# Patient Record
Sex: FEMALE | Race: WHITE | NOT HISPANIC OR LATINO | Employment: OTHER | URBAN - METROPOLITAN AREA
[De-identification: names, ages, dates, MRNs, and addresses within clinical notes are randomized per-mention and may not be internally consistent; named-entity substitution may affect disease eponyms.]

---

## 2020-01-09 ENCOUNTER — OFFICE VISIT (OUTPATIENT)
Dept: PODIATRY | Facility: CLINIC | Age: 70
End: 2020-01-09
Payer: MEDICARE

## 2020-01-09 ENCOUNTER — HOSPITAL ENCOUNTER (OUTPATIENT)
Dept: RADIOLOGY | Facility: HOSPITAL | Age: 70
Discharge: HOME | End: 2020-01-09
Attending: PODIATRIST
Payer: MEDICARE

## 2020-01-09 VITALS
RESPIRATION RATE: 19 BRPM | OXYGEN SATURATION: 98 % | SYSTOLIC BLOOD PRESSURE: 153 MMHG | TEMPERATURE: 98 F | DIASTOLIC BLOOD PRESSURE: 90 MMHG | HEIGHT: 61 IN | BODY MASS INDEX: 32.1 KG/M2 | WEIGHT: 170 LBS | HEART RATE: 74 BPM

## 2020-01-09 DIAGNOSIS — M79.671 HEEL PAIN, BILATERAL: ICD-10-CM

## 2020-01-09 DIAGNOSIS — M76.62 ACHILLES TENDINITIS OF LEFT LOWER EXTREMITY: ICD-10-CM

## 2020-01-09 DIAGNOSIS — M79.672 HEEL PAIN, BILATERAL: ICD-10-CM

## 2020-01-09 DIAGNOSIS — M77.51 BURSITIS OF POSTERIOR HEEL, RIGHT: Primary | ICD-10-CM

## 2020-01-09 DIAGNOSIS — M77.31 CALCANEAL SPUR OF RIGHT FOOT: ICD-10-CM

## 2020-01-09 DIAGNOSIS — G57.91 NEURITIS OF RIGHT FOOT: ICD-10-CM

## 2020-01-09 DIAGNOSIS — M77.52 BURSITIS OF POSTERIOR HEEL, LEFT: ICD-10-CM

## 2020-01-09 DIAGNOSIS — M76.61 ACHILLES TENDINITIS OF RIGHT LOWER EXTREMITY: ICD-10-CM

## 2020-01-09 PROCEDURE — 99999 PR PBB SHADOW E&M-NEW PATIENT-LVL III: ICD-10-PCS | Mod: PBBFAC,,, | Performed by: PODIATRIST

## 2020-01-09 PROCEDURE — 73630 X-RAY EXAM OF FOOT: CPT | Mod: 50,TC,FY

## 2020-01-09 PROCEDURE — 1125F AMNT PAIN NOTED PAIN PRSNT: CPT | Mod: ,,, | Performed by: PODIATRIST

## 2020-01-09 PROCEDURE — 73630 XR FOOT COMPLETE 3 VIEW BILATERAL: ICD-10-PCS | Mod: 26,50,, | Performed by: RADIOLOGY

## 2020-01-09 PROCEDURE — 99204 OFFICE O/P NEW MOD 45 MIN: CPT | Mod: 25,S$PBB,, | Performed by: PODIATRIST

## 2020-01-09 PROCEDURE — 1159F MED LIST DOCD IN RCRD: CPT | Mod: ,,, | Performed by: PODIATRIST

## 2020-01-09 PROCEDURE — 99204 PR OFFICE/OUTPT VISIT, NEW, LEVL IV, 45-59 MIN: ICD-10-PCS | Mod: 25,S$PBB,, | Performed by: PODIATRIST

## 2020-01-09 PROCEDURE — 1125F PR PAIN SEVERITY QUANTIFIED, PAIN PRESENT: ICD-10-PCS | Mod: ,,, | Performed by: PODIATRIST

## 2020-01-09 PROCEDURE — 1159F PR MEDICATION LIST DOCUMENTED IN MEDICAL RECORD: ICD-10-PCS | Mod: ,,, | Performed by: PODIATRIST

## 2020-01-09 PROCEDURE — 96372 THER/PROPH/DIAG INJ SC/IM: CPT | Mod: PBBFAC,RT

## 2020-01-09 PROCEDURE — 73630 X-RAY EXAM OF FOOT: CPT | Mod: 26,50,, | Performed by: RADIOLOGY

## 2020-01-09 PROCEDURE — 99999 PR PBB SHADOW E&M-NEW PATIENT-LVL III: CPT | Mod: PBBFAC,,, | Performed by: PODIATRIST

## 2020-01-09 PROCEDURE — 99203 OFFICE O/P NEW LOW 30 MIN: CPT | Mod: PBBFAC,25 | Performed by: PODIATRIST

## 2020-01-09 RX ORDER — LOSARTAN POTASSIUM 50 MG/1
TABLET ORAL
COMMUNITY
Start: 2019-12-29 | End: 2020-09-03

## 2020-01-09 RX ORDER — BETAMETHASONE SODIUM PHOSPHATE AND BETAMETHASONE ACETATE 3; 3 MG/ML; MG/ML
18 INJECTION, SUSPENSION INTRA-ARTICULAR; INTRALESIONAL; INTRAMUSCULAR; SOFT TISSUE
Status: COMPLETED | OUTPATIENT
Start: 2020-01-09 | End: 2020-01-09

## 2020-01-09 RX ORDER — METOPROLOL SUCCINATE 50 MG/1
100 TABLET, EXTENDED RELEASE ORAL
COMMUNITY
Start: 2019-12-26 | End: 2020-09-03

## 2020-01-09 RX ORDER — CETIRIZINE HYDROCHLORIDE 10 MG/1
10 TABLET ORAL DAILY
COMMUNITY
Start: 2019-12-26

## 2020-01-09 RX ORDER — LOSARTAN POTASSIUM 25 MG/1
TABLET ORAL
COMMUNITY
Start: 2019-12-14 | End: 2020-09-03

## 2020-01-09 RX ORDER — PANTOPRAZOLE SODIUM 40 MG/1
TABLET, DELAYED RELEASE ORAL
COMMUNITY
Start: 2019-12-16

## 2020-01-09 RX ORDER — HYDROCHLOROTHIAZIDE 12.5 MG/1
TABLET ORAL
COMMUNITY
Start: 2019-12-29 | End: 2020-09-03

## 2020-01-09 RX ORDER — DICLOFENAC SODIUM 10 MG/G
2 GEL TOPICAL 3 TIMES DAILY
Qty: 100 G | Refills: 2 | Status: SHIPPED | OUTPATIENT
Start: 2020-01-09 | End: 2020-09-03

## 2020-01-09 RX ORDER — CYCLOBENZAPRINE HCL 10 MG
TABLET ORAL
COMMUNITY
Start: 2020-01-08

## 2020-01-09 RX ORDER — LEVOTHYROXINE SODIUM 75 UG/1
TABLET ORAL
COMMUNITY
Start: 2019-12-16

## 2020-01-09 RX ADMIN — BETAMETHASONE ACETATE AND BETAMETHASONE SODIUM PHOSPHATE 18 MG: 3; 3 INJECTION, SUSPENSION INTRA-ARTICULAR; INTRALESIONAL; INTRAMUSCULAR; SOFT TISSUE at 10:01

## 2020-01-11 NOTE — PROGRESS NOTES
"Subjective:       Patient ID: Judith Reyer is a 69 y.o. female.    Chief Complaint: Foot Problem (heel feels like she is walking on bone)  Patient presents with complaint of pain on the back of both heels, "feels like she is walking on bones."  States bone is sticking out of the back of the heels, very swollen, right much worse than the left.  Reports pain started gradually over the last few months, having great difficulty walking especially with the right foot.  Denies injury. Can only wear one pair shoes and not very comfortably. Pain level 10/10    Past Medical History:   Diagnosis Date    Allergy     Hypertension     Hypothyroidism      History reviewed. No pertinent surgical history.  History reviewed. No pertinent family history.  Social History     Socioeconomic History    Marital status:      Spouse name: Not on file    Number of children: Not on file    Years of education: Not on file    Highest education level: Not on file   Occupational History    Not on file   Social Needs    Financial resource strain: Not on file    Food insecurity:     Worry: Not on file     Inability: Not on file    Transportation needs:     Medical: Not on file     Non-medical: Not on file   Tobacco Use    Smoking status: Former Smoker    Smokeless tobacco: Never Used    Tobacco comment: 3 years ago quit   Substance and Sexual Activity    Alcohol use: Not Currently    Drug use: Never    Sexual activity: Not Currently   Lifestyle    Physical activity:     Days per week: Not on file     Minutes per session: Not on file    Stress: Not on file   Relationships    Social connections:     Talks on phone: Not on file     Gets together: Not on file     Attends Restorationism service: Not on file     Active member of club or organization: Not on file     Attends meetings of clubs or organizations: Not on file     Relationship status: Not on file   Other Topics Concern    Not on file   Social History Narrative    Not on " "file       Current Outpatient Medications   Medication Sig Dispense Refill    cetirizine (ZYRTEC) 10 MG tablet Take 10 mg by mouth once daily.      cyclobenzaprine (FLEXERIL) 10 MG tablet       diclofenac sodium (VOLTAREN) 1 % Gel Apply 2 g topically 3 (three) times daily. 100 g 2    hydroCHLOROthiazide (HYDRODIURIL) 12.5 MG Tab       losartan (COZAAR) 25 MG tablet       losartan (COZAAR) 50 MG tablet       metoprolol succinate (TOPROL-XL) 50 MG 24 hr tablet       pantoprazole (PROTONIX) 40 MG tablet       SYNTHROID 75 mcg tablet        No current facility-administered medications for this visit.      Review of patient's allergies indicates:   Allergen Reactions    Macrodantin [nitrofurantoin macrocrystalline]     Penicillins        Review of Systems   Constitutional: Negative for fever.   HENT: Negative for congestion.    Respiratory: Negative for cough and shortness of breath.    Cardiovascular: Negative for leg swelling.        Positive foot swelling   Musculoskeletal: Positive for gait problem.        Antalgic gait due to pain    All other systems reviewed and are negative.      Objective:      Vitals:    01/09/20 0855   BP: (!) 153/90   Pulse: 74   Resp: 19   Temp: 97.6 °F (36.4 °C)   TempSrc: Oral   SpO2: 98%   Weight: 77.1 kg (170 lb)   Height: 5' 1" (1.549 m)     Physical Exam   Constitutional: She appears well-developed and well-nourished.   Pulmonary/Chest: Effort normal.   Musculoskeletal: She exhibits edema, tenderness and deformity.   Skin: Skin is warm. Capillary refill takes less than 2 seconds. There is erythema.   Posterior right heel   Psychiatric: She has a normal mood and affect.   Nursing note and vitals reviewed.  Vascular         Normal CFT bilateral   No lower extremity edema bilateral   Pedal skin temperature and color are normal bilateral     Integumentary   Protruding edema, calor due to bursitis posterior right heel.  Mild bursitis with edema, no calor left heel     "     Neurological   Gross sensation intact bilateral feet     Musculoskeletal   Muscle Strength/Testing and Tone:  Guarding R>L,  normal tone bilateral   Joints, Bones, and Muscles:  Painful bursitis and Achilles tendinitis right greater than left, limited range of motion right greater than left        Antalgic gait         Presents in appropriate shoes      X-Ray Foot Complete Bilateral   Details     Reading Physician Reading Date Result Priority   Isidoro Quezada MD 1/9/2020 Routine      Narrative     EXAMINATION:  XR FOOT COMPLETE 3 VIEW BILATERAL    CLINICAL HISTORY:  Pain in right foot    TECHNIQUE:  AP, lateral, and oblique views of both feet were performed.    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  The remaining joint spaces are preserved.    Tarsal bones are intact.  Normal tarsometatarsal alignment.  Bipartite os peroneum.  Prominent dorsal and plantar calcaneal spurs.    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 The remaining joint spaces are preserved.    Tarsal bones are intact. Normal tarsometatarsal alignment. Bipartite os peroneum.  Small dorsal and prominent plantar calcaneal spurs.      Impression       1. No acute radiographic findings of the bilateral feet.  2. Bilateral calcaneal spurs.                Assessment:       1. Bursitis of posterior heel, right    2. Achilles tendinitis of right lower extremity    3. Calcaneal spur of right foot    4. Neuritis of right foot    5. Heel pain, bilateral    6. Bursitis of posterior heel, left    7. Achilles tendinitis of left lower extremity        Plan:           X-RAY THREE VIEWS BILATERAL FEET  3 CC BETAMETHASONE RIGHT SIDE  VOLTAREN GEL 1% 3 TIMES DAILY BILATERAL  AIRCAST WALKING BOOT RIGHT      Reviewed large posterior calcaneal spur on the right in the area of pain and significant amount of  inflammation /bursitis.  Advised patient however this has been present for a lengthy time and treatments for inflammation can be very effective.   Pointed out patient is starting to have the same symptoms on the back of the left heel with absolutely no spur on x-ray.  Discussed treatments for inflammation.  We reviewed oral and topical anti-inflammatory, ice /cool therapy and immobilization for the right foot  Recommended IM cortisone injection due to pain level, location, amount of bursitis noted and symptoms in the other foot.   We discussed effectiveness of this medication in reducing inflammation, potential side effects and length of time injection may be beneficial.  Explained this is done in combination with all other conservative treatments for best results.  Patient  Was in understanding and agreement with treatment plan and did want to pursue IM cortisone injection today.  Administered 3 cc betamethasone right side.  Dispensed orders for walking boot, put arch support inside, wear at all times of weight-bearing  Reviewed frequency and with Voltaren gel and ice therapy are to be applied bilateral  Limit activity  Patient/family were in understanding and agreement with treatment plan.  I counseled the patient on their conditions, implications and medical management.  Instructed patient/family to contact the office with any changes, questions, concerns, worsening of symptoms.   Total face-to-face time, exam, assessment, treatment, discussion, documentation >45 minutes, more than half this time spent on consultation and coordination of care.  Follow up 1 week    This note was created using [employer] voice recognition software that occasionally misinterpreted phrases or words.

## 2020-01-16 ENCOUNTER — OFFICE VISIT (OUTPATIENT)
Dept: PODIATRY | Facility: CLINIC | Age: 70
End: 2020-01-16
Payer: MEDICARE

## 2020-01-16 VITALS
BODY MASS INDEX: 32.1 KG/M2 | TEMPERATURE: 98 F | WEIGHT: 170 LBS | HEART RATE: 51 BPM | HEIGHT: 61 IN | DIASTOLIC BLOOD PRESSURE: 81 MMHG | SYSTOLIC BLOOD PRESSURE: 133 MMHG | OXYGEN SATURATION: 96 %

## 2020-01-16 DIAGNOSIS — M77.31 CALCANEAL SPUR OF RIGHT FOOT: ICD-10-CM

## 2020-01-16 DIAGNOSIS — G57.91 NEURITIS OF RIGHT FOOT: ICD-10-CM

## 2020-01-16 DIAGNOSIS — M79.671 HEEL PAIN, BILATERAL: ICD-10-CM

## 2020-01-16 DIAGNOSIS — M77.51 BURSITIS OF POSTERIOR HEEL, RIGHT: ICD-10-CM

## 2020-01-16 DIAGNOSIS — M79.672 HEEL PAIN, BILATERAL: ICD-10-CM

## 2020-01-16 DIAGNOSIS — M76.61 ACHILLES TENDINITIS OF RIGHT LOWER EXTREMITY: Primary | ICD-10-CM

## 2020-01-16 PROCEDURE — 96372 THER/PROPH/DIAG INJ SC/IM: CPT | Mod: PBBFAC,RT

## 2020-01-16 PROCEDURE — 1125F PR PAIN SEVERITY QUANTIFIED, PAIN PRESENT: ICD-10-PCS | Mod: ,,, | Performed by: PODIATRIST

## 2020-01-16 PROCEDURE — 1159F MED LIST DOCD IN RCRD: CPT | Mod: ,,, | Performed by: PODIATRIST

## 2020-01-16 PROCEDURE — 1125F AMNT PAIN NOTED PAIN PRSNT: CPT | Mod: ,,, | Performed by: PODIATRIST

## 2020-01-16 PROCEDURE — 99213 OFFICE O/P EST LOW 20 MIN: CPT | Mod: PBBFAC,25 | Performed by: PODIATRIST

## 2020-01-16 PROCEDURE — 99214 OFFICE O/P EST MOD 30 MIN: CPT | Mod: 25,S$PBB,, | Performed by: PODIATRIST

## 2020-01-16 PROCEDURE — 99999 PR PBB SHADOW E&M-EST. PATIENT-LVL III: ICD-10-PCS | Mod: PBBFAC,,, | Performed by: PODIATRIST

## 2020-01-16 PROCEDURE — 99999 PR PBB SHADOW E&M-EST. PATIENT-LVL III: CPT | Mod: PBBFAC,,, | Performed by: PODIATRIST

## 2020-01-16 PROCEDURE — 1159F PR MEDICATION LIST DOCUMENTED IN MEDICAL RECORD: ICD-10-PCS | Mod: ,,, | Performed by: PODIATRIST

## 2020-01-16 PROCEDURE — 99214 PR OFFICE/OUTPT VISIT, EST, LEVL IV, 30-39 MIN: ICD-10-PCS | Mod: 25,S$PBB,, | Performed by: PODIATRIST

## 2020-01-16 RX ORDER — NAPROXEN SODIUM 220 MG
220 TABLET ORAL 2 TIMES DAILY WITH MEALS
COMMUNITY
End: 2020-09-03

## 2020-01-16 RX ORDER — UBIDECARENONE 75 MG
2500 CAPSULE ORAL DAILY
COMMUNITY
End: 2020-09-03

## 2020-01-16 RX ORDER — KETOROLAC TROMETHAMINE 30 MG/ML
30 INJECTION, SOLUTION INTRAMUSCULAR; INTRAVENOUS
Status: COMPLETED | OUTPATIENT
Start: 2020-01-16 | End: 2020-01-16

## 2020-01-16 RX ORDER — AMOXICILLIN 500 MG
550 CAPSULE ORAL DAILY
COMMUNITY
End: 2022-11-26 | Stop reason: SDUPTHER

## 2020-01-16 RX ADMIN — KETOROLAC TROMETHAMINE 30 MG: 30 INJECTION, SOLUTION INTRAMUSCULAR; INTRAVENOUS at 11:01

## 2020-01-18 PROBLEM — M76.61 ACHILLES TENDINITIS OF RIGHT LOWER EXTREMITY: Status: ACTIVE | Noted: 2020-01-18

## 2020-01-18 PROBLEM — M77.31 CALCANEAL SPUR OF RIGHT FOOT: Status: ACTIVE | Noted: 2020-01-18

## 2020-01-18 PROBLEM — M77.51: Status: ACTIVE | Noted: 2020-01-18

## 2020-01-18 NOTE — PROGRESS NOTES
Subjective:       Patient ID: Judith Reyer is a 69 y.o. female.    Chief Complaint: Follow-up (bursitis of posterior heel right)  Patient presents for follow-up Achilles tendinitis and bursitis with large calcaneal spur right.  Mild bursitis and Achilles tendinitis left.  Patient confirms she has been wearing walking boot at all times she feels the boot and cortisone injection has helped significantly and is noticed a lot of swelling on the back of the heel has resolved.  She is applying Voltaren gel twice a day, pain level down from 10 to 6/10.        Past Medical History:   Diagnosis Date    Allergy     Hypertension     Hypothyroidism      Past Surgical History:   Procedure Laterality Date    CHOLECYSTECTOMY      FOOT SURGERY      HYSTERECTOMY       Family History   Problem Relation Age of Onset    Stroke Mother     Cirrhosis Father      Social History     Socioeconomic History    Marital status:      Spouse name: Not on file    Number of children: Not on file    Years of education: Not on file    Highest education level: Not on file   Occupational History    Not on file   Social Needs    Financial resource strain: Not on file    Food insecurity:     Worry: Not on file     Inability: Not on file    Transportation needs:     Medical: Not on file     Non-medical: Not on file   Tobacco Use    Smoking status: Former Smoker    Smokeless tobacco: Never Used    Tobacco comment: 3 years ago quit   Substance and Sexual Activity    Alcohol use: Not Currently    Drug use: Never    Sexual activity: Not Currently   Lifestyle    Physical activity:     Days per week: Not on file     Minutes per session: Not on file    Stress: Not on file   Relationships    Social connections:     Talks on phone: Not on file     Gets together: Not on file     Attends Denominational service: Not on file     Active member of club or organization: Not on file     Attends meetings of clubs or organizations: Not on file      "Relationship status: Not on file   Other Topics Concern    Not on file   Social History Narrative    Not on file       Current Outpatient Medications   Medication Sig Dispense Refill    cetirizine (ZYRTEC) 10 MG tablet Take 10 mg by mouth once daily.      cyanocobalamin 500 MCG tablet Take 2,500 mcg by mouth once daily.      cyclobenzaprine (FLEXERIL) 10 MG tablet       diclofenac sodium (VOLTAREN) 1 % Gel Apply 2 g topically 3 (three) times daily. 100 g 2    hydroCHLOROthiazide (HYDRODIURIL) 12.5 MG Tab       losartan (COZAAR) 25 MG tablet       losartan (COZAAR) 50 MG tablet       metoprolol succinate (TOPROL-XL) 50 MG 24 hr tablet       naproxen sodium (ANAPROX) 220 MG tablet Take 220 mg by mouth 2 (two) times daily with meals.      omega-3 fatty acids/fish oil (FISH OIL-OMEGA-3 FATTY ACIDS) 300-1,000 mg capsule Take 550 capsules by mouth once daily.      pantoprazole (PROTONIX) 40 MG tablet       SYNTHROID 75 mcg tablet        No current facility-administered medications for this visit.      Review of patient's allergies indicates:   Allergen Reactions    Macrodantin [nitrofurantoin macrocrystalline]     Penicillins        Review of Systems   Constitutional: Negative for fever.   HENT: Negative for congestion.    Respiratory: Negative for cough.    Cardiovascular: Negative for leg swelling.        Positive foot swelling   Musculoskeletal: Positive for gait problem.        Aircast boot right   All other systems reviewed and are negative.      Objective:      Vitals:    01/16/20 1018   BP: 133/81   Pulse: (!) 51   Temp: 98 °F (36.7 °C)   TempSrc: Oral   SpO2: 96%   Weight: 77.1 kg (170 lb)   Height: 5' 1" (1.549 m)     Physical Exam   Constitutional: She appears well-developed and well-nourished.   Cardiovascular:   Pulses:       Dorsalis pedis pulses are 2+ on the right side, and 2+ on the left side.        Posterior tibial pulses are 2+ on the right side, and 2+ on the left side. "   Pulmonary/Chest: Effort normal.   Musculoskeletal: She exhibits edema, tenderness and deformity.        Right foot: There is decreased range of motion and deformity.        Left foot: There is decreased range of motion.   Feet:   Right Foot:   Skin Integrity: Negative for erythema or warmth.   Left Foot:   Skin Integrity: Negative for erythema or warmth.   Skin: Capillary refill takes less than 2 seconds.   Posterior right heel   Psychiatric: She has a normal mood and affect.   Nursing note and vitals reviewed.  Vascular         Normal CFT bilateral   No lower extremity edema bilateral   Pedal skin temperature and color are normal bilateral     Integumentary   Considerable decrease in protruding edema/ bursitis posterior right heel, o erythema or calor  Minimal edema/bursitis posterior left heel, no erythema or calor         Neurological   Gross sensation intact bilateral feet     Musculoskeletal   Muscle Strength/Testing and Tone:   Improvement in strength with less guarding right foot and ankle, intact with little discomfort left ankle,  normal tone bilateral   Joints, Bones, and Muscles:  Bursitis and Achilles tendinitis right greater than left  AJ equinus right greater than left              X-Ray Foot Complete Bilateral   Details     Reading Physician Reading Date Result Priority   Isidoro Quezada MD 1/9/2020 Routine      Narrative     EXAMINATION:  XR FOOT COMPLETE 3 VIEW BILATERAL    CLINICAL HISTORY:  Pain in right foot    TECHNIQUE:  AP, lateral, and oblique views of both feet were performed.    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  The remaining joint spaces are preserved.    Tarsal bones are intact.  Normal tarsometatarsal alignment.  Bipartite os peroneum.  Prominent dorsal and plantar calcaneal spurs.    Left:    Mild degenerative osteoarthrosis involving the 1st MTP joint and the IP joints of the  digits. The remaining joint spaces are preserved.    Tarsal bones are intact. Normal tarsometatarsal alignment. Bipartite os peroneum.  Small dorsal and prominent plantar calcaneal spurs.      Impression       1. No acute radiographic findings of the bilateral feet.  2. Bilateral calcaneal spurs.                Assessment:       1. Achilles tendinitis of right lower extremity    2. Bursitis of posterior heel, right    3. Calcaneal spur of right foot    4. Neuritis of right foot    5. Heel pain, bilateral        Plan:           30 MG TORADOL IM RIGHT  CONTINUE AIRCAST WALKING BOOT RIGHT  CONTINUE VOLTAREN      Advised patient there is significant reduction in swelling on the back of the right heel, warmth and redness has resolved.  Advised due to location it does take quite a while for bursitis to resolve, as this takes tension off the Achilles tendon pain will decrese futher and we can start stretching.   Reviewed up previous x-rays large spur on the back of the right heel.  Advised patient conservative treatments concentrate on reducing swelling, bursitis and tendinitis in this area and she is to remain in the walking boot for the next 2 weeks.  We did discuss symptoms starting in the same location on the left heel, reviewed x-rays regarding this location where there is no spur present.  Explained the patient she needs to continue to wear a shoe with a thick sole to help elevate the back of the heel, utilize ice and Voltaren Gel in this area as well as the back of the right heel.  She needs to continue to apply the topical anti-inflammatories daily on a regular basis over the next 2 weeks.  Will consider PT next visit.  Recommended IM toradol. We discussed medication, NSAID, and effectivness in decreasing inflammation, difference from IM cortisone,   Potential side effects and length of time injection may be beneficial.  Instructed patient do not take any over-the-counter or prescription anti-inflammatory for the  next 48 hr.  Patient was in understanding and agreement with treatment plan and did want to pursue injection today.  Administered 1 mL  Toradol right side  I counseled the patient on their conditions, implications and medical management.  Instructed patient/family to contact the office with any changes, questions, concerns, worsening of symptoms.   Total face-to-face time, exam, assessment, treatment, discussion, documentation 25 minutes, more than half this time spent on consultation and coordination of care.  Follow up 2 weeks      This note was created using [employer] voice recognition software that occasionally misinterpreted phrases or words.

## 2020-01-31 ENCOUNTER — OFFICE VISIT (OUTPATIENT)
Dept: PODIATRY | Facility: CLINIC | Age: 70
End: 2020-01-31
Payer: MEDICARE

## 2020-01-31 VITALS
DIASTOLIC BLOOD PRESSURE: 82 MMHG | WEIGHT: 170 LBS | SYSTOLIC BLOOD PRESSURE: 131 MMHG | HEIGHT: 61 IN | HEART RATE: 63 BPM | TEMPERATURE: 98 F | BODY MASS INDEX: 32.1 KG/M2

## 2020-01-31 DIAGNOSIS — M77.31 CALCANEAL SPUR OF RIGHT FOOT: ICD-10-CM

## 2020-01-31 DIAGNOSIS — M76.61 ACHILLES TENDINITIS OF RIGHT LOWER EXTREMITY: Primary | ICD-10-CM

## 2020-01-31 DIAGNOSIS — M77.51 BURSITIS OF POSTERIOR HEEL, RIGHT: ICD-10-CM

## 2020-01-31 PROCEDURE — 99999 PR PBB SHADOW E&M-EST. PATIENT-LVL III: CPT | Mod: PBBFAC,,, | Performed by: PODIATRIST

## 2020-01-31 PROCEDURE — 99999 PR PBB SHADOW E&M-EST. PATIENT-LVL III: ICD-10-PCS | Mod: PBBFAC,,, | Performed by: PODIATRIST

## 2020-01-31 PROCEDURE — 99214 PR OFFICE/OUTPT VISIT, EST, LEVL IV, 30-39 MIN: ICD-10-PCS | Mod: S$PBB,,, | Performed by: PODIATRIST

## 2020-01-31 PROCEDURE — 99213 OFFICE O/P EST LOW 20 MIN: CPT | Mod: PBBFAC | Performed by: PODIATRIST

## 2020-01-31 PROCEDURE — 99214 OFFICE O/P EST MOD 30 MIN: CPT | Mod: S$PBB,,, | Performed by: PODIATRIST

## 2020-02-02 NOTE — PROGRESS NOTES
Subjective:       Patient ID: Judith Reyer is a 69 y.o. female.    Chief Complaint: Follow-up; Foot Problem; and Heel Pain  Patient presents for follow-up Achilles tendinitis and bursitis with calcaneal spur right.   Patient states she transitioned out of the boot 1st week, earlier this week stopped wearing and is wearing her tennis shoes with arch supports.  States she has pain has decreased considerably prior to wearing the boot.  She does still have some residual discomfort and it does increase with activity but feels it is healing.  Has been wearing tennis shoes all the time last few days.  Pain level 2/10         Past Medical History:   Diagnosis Date    Allergy     Hypertension     Hypothyroidism      Past Surgical History:   Procedure Laterality Date    CHOLECYSTECTOMY      FOOT SURGERY      HYSTERECTOMY       Family History   Problem Relation Age of Onset    Stroke Mother     Cirrhosis Father      Social History     Socioeconomic History    Marital status:      Spouse name: Not on file    Number of children: Not on file    Years of education: Not on file    Highest education level: Not on file   Occupational History    Not on file   Social Needs    Financial resource strain: Not on file    Food insecurity:     Worry: Not on file     Inability: Not on file    Transportation needs:     Medical: Not on file     Non-medical: Not on file   Tobacco Use    Smoking status: Former Smoker    Smokeless tobacco: Never Used    Tobacco comment: 3 years ago quit   Substance and Sexual Activity    Alcohol use: Not Currently    Drug use: Never    Sexual activity: Not Currently   Lifestyle    Physical activity:     Days per week: Not on file     Minutes per session: Not on file    Stress: Not on file   Relationships    Social connections:     Talks on phone: Not on file     Gets together: Not on file     Attends [employer] service: Not on file     Active member of club or organization: Not on  "file     Attends meetings of clubs or organizations: Not on file     Relationship status: Not on file   Other Topics Concern    Not on file   Social History Narrative    Not on file       Current Outpatient Medications   Medication Sig Dispense Refill    cetirizine (ZYRTEC) 10 MG tablet Take 10 mg by mouth once daily.      cyanocobalamin 500 MCG tablet Take 2,500 mcg by mouth once daily.      cyclobenzaprine (FLEXERIL) 10 MG tablet       diclofenac sodium (VOLTAREN) 1 % Gel Apply 2 g topically 3 (three) times daily. 100 g 2    hydroCHLOROthiazide (HYDRODIURIL) 12.5 MG Tab       losartan (COZAAR) 25 MG tablet       metoprolol succinate (TOPROL-XL) 50 MG 24 hr tablet       naproxen sodium (ANAPROX) 220 MG tablet Take 220 mg by mouth 2 (two) times daily with meals.      omega-3 fatty acids/fish oil (FISH OIL-OMEGA-3 FATTY ACIDS) 300-1,000 mg capsule Take 550 capsules by mouth once daily.      pantoprazole (PROTONIX) 40 MG tablet       SYNTHROID 75 mcg tablet       losartan (COZAAR) 50 MG tablet        No current facility-administered medications for this visit.      Review of patient's allergies indicates:   Allergen Reactions    Macrodantin [nitrofurantoin macrocrystalline]     Penicillins        Review of Systems   Constitutional: Negative for fever.   HENT: Negative for congestion.    Respiratory: Negative for cough.    Cardiovascular: Negative for leg swelling.        Positive foot swelling   All other systems reviewed and are negative.      Objective:      Vitals:    01/31/20 0859   BP: 131/82   Pulse: 63   Temp: 97.7 °F (36.5 °C)   Weight: 77.1 kg (170 lb)   Height: 5' 1" (1.549 m)     Physical Exam   Constitutional: She appears well-developed and well-nourished.   Cardiovascular:   Pulses:       Dorsalis pedis pulses are 2+ on the right side, and 2+ on the left side.        Posterior tibial pulses are 2+ on the right side, and 2+ on the left side.   Pulmonary/Chest: Effort normal. "   Musculoskeletal: She exhibits tenderness and deformity.        Right foot: There is decreased range of motion and deformity.        Left foot: There is decreased range of motion.   improved   Feet:   Right Foot:   Skin Integrity: Negative for erythema.   Skin: Capillary refill takes less than 2 seconds.   Posterior right heel   Psychiatric: She has a normal mood and affect.   Nursing note and vitals reviewed.  Vascular         Normal CFT bilateral   No lower extremity edema bilateral   Pedal skin temperature and color are normal bilateral     Integumentary   Significant improvement in bursitis posterior right heel, no erythema or calor  No pain posterior left heel today        Neurological   Gross sensation intact bilateral feet     Musculoskeletal   Muscle Strength/Testing and Tone:   Normal strength,  normal tone bilateral   Joints, Bones, and Muscles:  Bursitis and Achilles tendinitis right greater than left  AJ equinus right greater than left              X-Ray Foot Complete Bilateral   Details     Reading Physician Reading Date Result Priority   Isidoro Quezada MD 1/9/2020 Routine      Narrative     EXAMINATION:  XR FOOT COMPLETE 3 VIEW BILATERAL    CLINICAL HISTORY:  Pain in right foot    TECHNIQUE:  AP, lateral, and oblique views of both feet were performed.    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  The remaining joint spaces are preserved.    Tarsal bones are intact.  Normal tarsometatarsal alignment.  Bipartite os peroneum.  Prominent dorsal and plantar calcaneal spurs.    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 The remaining joint spaces are preserved.    Tarsal bones are intact. Normal tarsometatarsal alignment. Bipartite os peroneum.  Small dorsal and prominent plantar calcaneal spurs.      Impression       1. No acute radiographic findings  of the bilateral feet.  2. Bilateral calcaneal spurs.                Assessment:       1. Achilles tendinitis of right lower extremity    2. Bursitis of posterior heel, right    3. Calcaneal spur of right foot        Plan:           Reviewed Achilles tendinitis, bursitis and calcaneal spur or with patient at length.  This is present bilateral but symptoms much more severe on the right.  Advised patient there is significant improvement, considerable reduction in inflammation on the back of the right heel and this is a condition that she needs to continue to treat daily.  We discussed better tennis shoes.  Instructed patient to utilize running shoes with a thick sole for shock absorption.  They should be light, breathable fabric, removing insole in placing power step inside.    Recommendations were [employer]  We discussed additional heel lifts.  Wear tennis shoes indoors at all times, no flat shoes or walking barefoot  Continue ice, Voltaren Gel, we discussed Aspercreme patches over-the-counter  Reviewed stretching  In definitely  Explained patient although she has had a significant improvement inflammation is not completely resolved in the back of the right heel and she needs to continue to utilize walking boot for any prolonged walking or standing for few more weeks.  Advised patient any increasing pain and swelling which is not improved with all conservative treatments reviewed today with in 2-3 days she needs to be seen for follow-up   patient was in understanding and agreement with treatment plan  I counseled the patient on their conditions, implications and medical management.  Instructed patient/family to contact the office with any changes, questions, concerns, worsening of symptoms.   Total face-to-face time, exam, assessment, treatment, discussion, documentation 25 minutes, more than half this time spent on consultation and coordination of care.  Follow up as needed      This note was created  using [employer] voice recognition software that occasionally misinterpreted phrases or words.

## 2020-08-27 ENCOUNTER — HOSPITAL ENCOUNTER (OUTPATIENT)
Dept: RADIOLOGY | Facility: HOSPITAL | Age: 70
Discharge: HOME | End: 2020-08-27
Attending: PODIATRIST
Payer: MEDICARE

## 2020-08-27 ENCOUNTER — TELEPHONE (OUTPATIENT)
Dept: PODIATRY | Facility: CLINIC | Age: 70
End: 2020-08-27

## 2020-08-27 ENCOUNTER — OFFICE VISIT (OUTPATIENT)
Dept: PODIATRY | Facility: CLINIC | Age: 70
End: 2020-08-27
Payer: MEDICARE

## 2020-08-27 VITALS
RESPIRATION RATE: 19 BRPM | OXYGEN SATURATION: 98 % | SYSTOLIC BLOOD PRESSURE: 159 MMHG | BODY MASS INDEX: 32.1 KG/M2 | WEIGHT: 170 LBS | HEIGHT: 61 IN | DIASTOLIC BLOOD PRESSURE: 91 MMHG | TEMPERATURE: 98 F | HEART RATE: 62 BPM

## 2020-08-27 DIAGNOSIS — M76.61 ACHILLES TENDINITIS OF RIGHT LOWER EXTREMITY: ICD-10-CM

## 2020-08-27 DIAGNOSIS — S86.011D ACHILLES TENDON TEAR, RIGHT, SUBSEQUENT ENCOUNTER: ICD-10-CM

## 2020-08-27 DIAGNOSIS — G57.91 NEURITIS OF RIGHT FOOT: ICD-10-CM

## 2020-08-27 DIAGNOSIS — M72.2 PLANTAR FASCIITIS: ICD-10-CM

## 2020-08-27 DIAGNOSIS — M77.31 CALCANEAL SPUR OF FOOT, RIGHT: ICD-10-CM

## 2020-08-27 DIAGNOSIS — M62.9 NONTRAUMATIC TEAR OF PLANTAR FASCIA: Primary | ICD-10-CM

## 2020-08-27 PROCEDURE — 96372 THER/PROPH/DIAG INJ SC/IM: CPT | Mod: PBBFAC

## 2020-08-27 PROCEDURE — 99999 PR PBB SHADOW E&M-EST. PATIENT-LVL V: CPT | Mod: PBBFAC,,, | Performed by: PODIATRIST

## 2020-08-27 PROCEDURE — 73721 MRI ANKLE WITHOUT CONTRAST RIGHT: ICD-10-PCS | Mod: 26,RT,, | Performed by: RADIOLOGY

## 2020-08-27 PROCEDURE — 73630 X-RAY EXAM OF FOOT: CPT | Mod: 26,RT,, | Performed by: RADIOLOGY

## 2020-08-27 PROCEDURE — 99215 OFFICE O/P EST HI 40 MIN: CPT | Mod: 25,S$PBB,, | Performed by: PODIATRIST

## 2020-08-27 PROCEDURE — 99215 OFFICE O/P EST HI 40 MIN: CPT | Mod: PBBFAC,25 | Performed by: PODIATRIST

## 2020-08-27 PROCEDURE — 99999 PR PBB SHADOW E&M-EST. PATIENT-LVL V: ICD-10-PCS | Mod: PBBFAC,,, | Performed by: PODIATRIST

## 2020-08-27 PROCEDURE — 73721 MRI JNT OF LWR EXTRE W/O DYE: CPT | Mod: 26,RT,, | Performed by: RADIOLOGY

## 2020-08-27 PROCEDURE — 99215 PR OFFICE/OUTPT VISIT, EST, LEVL V, 40-54 MIN: ICD-10-PCS | Mod: 25,S$PBB,, | Performed by: PODIATRIST

## 2020-08-27 PROCEDURE — 73630 XR FOOT COMPLETE 3 VIEW RIGHT: ICD-10-PCS | Mod: 26,RT,, | Performed by: RADIOLOGY

## 2020-08-27 PROCEDURE — 73721 MRI JNT OF LWR EXTRE W/O DYE: CPT | Mod: TC,RT

## 2020-08-27 PROCEDURE — 73630 X-RAY EXAM OF FOOT: CPT | Mod: TC,FY,RT

## 2020-08-27 RX ORDER — BETAMETHASONE SODIUM PHOSPHATE AND BETAMETHASONE ACETATE 3; 3 MG/ML; MG/ML
18 INJECTION, SUSPENSION INTRA-ARTICULAR; INTRALESIONAL; INTRAMUSCULAR; SOFT TISSUE
Status: COMPLETED | OUTPATIENT
Start: 2020-08-27 | End: 2020-08-27

## 2020-08-27 RX ORDER — KETOROLAC TROMETHAMINE 30 MG/ML
30 INJECTION, SOLUTION INTRAMUSCULAR; INTRAVENOUS
Status: COMPLETED | OUTPATIENT
Start: 2020-08-27 | End: 2020-08-27

## 2020-08-27 RX ADMIN — BETAMETHASONE ACETATE AND BETAMETHASONE SODIUM PHOSPHATE 18 MG: 3; 3 INJECTION, SUSPENSION INTRA-ARTICULAR; INTRALESIONAL; INTRAMUSCULAR; SOFT TISSUE at 10:08

## 2020-08-27 RX ADMIN — KETOROLAC TROMETHAMINE 30 MG: 30 INJECTION, SOLUTION INTRAMUSCULAR; INTRAVENOUS at 10:08

## 2020-08-27 NOTE — TELEPHONE ENCOUNTER
Pt notified of results and instructions, understanding verbalized. Appointment scheduled for 9/3/2020 @9:00.

## 2020-08-27 NOTE — PROGRESS NOTES
Call pt and advise no rupture of the achilles tendon, but she does have 2 tears in plantar fascia. Needs to put arch support in walking boot, wear at all times for 1 week, make f/u for 1 week for possible cortisone in the heel. Thanks

## 2020-08-27 NOTE — PROGRESS NOTES
Subjective:       Patient ID: Judith Reyer is a 70 y.o. female.    Chief Complaint: Foot Pain  Patient presents with complaint pain right foot /heel.  We last saw the patient for Achilles tendinitis right foot h in January.  At that time she discontinued wearing walking boot and was pain free until she took a walk with her daughter.  She was going to start a walking regimen for exercise, however the 1st day she started she walked about 1/2 mile and had such severe pain in her heel her daughter had to go back home and get the car to pick her up.   She then wore old walking boot for few weeks, used Voltaren Gel and then discontinued wearing boot about a week and half ago with no improvement.  Reports pain on the back and bottom of the heel, more severe on the back.  States the area has been red, swollen, painful and warm.  She reports pain is much worse than it was previously, difficulty walking.  Pain level 8/10    Past Medical History:   Diagnosis Date    Allergy     Hypertension     Hypothyroidism      Past Surgical History:   Procedure Laterality Date    CHOLECYSTECTOMY      FOOT SURGERY      HYSTERECTOMY       Family History   Problem Relation Age of Onset    Stroke Mother     Cirrhosis Father      Social History     Socioeconomic History    Marital status:      Spouse name: Not on file    Number of children: Not on file    Years of education: Not on file    Highest education level: Not on file   Occupational History    Not on file   Social Needs    Financial resource strain: Not on file    Food insecurity     Worry: Not on file     Inability: Not on file    Transportation needs     Medical: Not on file     Non-medical: Not on file   Tobacco Use    Smoking status: Former Smoker    Smokeless tobacco: Never Used    Tobacco comment: 3 years ago quit   Substance and Sexual Activity    Alcohol use: Not Currently    Drug use: Never    Sexual activity: Not Currently   Lifestyle    Physical  activity     Days per week: Not on file     Minutes per session: Not on file    Stress: Not on file   Relationships    Social connections     Talks on phone: Not on file     Gets together: Not on file     Attends [employer] service: Not on file     Active member of club or organization: Not on file     Attends meetings of clubs or organizations: Not on file     Relationship status: Not on file   Other Topics Concern    Not on file   Social History Narrative    Not on file       Current Outpatient Medications   Medication Sig Dispense Refill    cetirizine (ZYRTEC) 10 MG tablet Take 10 mg by mouth once daily.      cyanocobalamin 500 MCG tablet Take 2,500 mcg by mouth once daily.      cyclobenzaprine (FLEXERIL) 10 MG tablet       diclofenac sodium (VOLTAREN) 1 % Gel Apply 2 g topically 3 (three) times daily. 100 g 2    hydroCHLOROthiazide (HYDRODIURIL) 12.5 MG Tab       losartan (COZAAR) 25 MG tablet       losartan (COZAAR) 50 MG tablet       metoprolol succinate (TOPROL-XL) 50 MG 24 hr tablet 100 mg.       naproxen sodium (ANAPROX) 220 MG tablet Take 220 mg by mouth 2 (two) times daily with meals.      omega-3 fatty acids/fish oil (FISH OIL-OMEGA-3 FATTY ACIDS) 300-1,000 mg capsule Take 550 capsules by mouth once daily.      pantoprazole (PROTONIX) 40 MG tablet       SYNTHROID 75 mcg tablet        No current facility-administered medications for this visit.      Review of patient's allergies indicates:   Allergen Reactions    Nitrofurantoin      Other reaction(s): Vomiting    Penicillin Rash    Macrodantin [nitrofurantoin macrocrystalline]     Penicillins        Review of Systems   Constitutional: Negative for fever.   HENT: Negative for congestion.    Respiratory: Negative for cough.    Cardiovascular: Positive for leg swelling.        Right   Musculoskeletal: Positive for gait problem.   All other systems reviewed and are negative.      Objective:      Vitals:    08/27/20 0909   BP: (!) 159/91  "  Pulse: 62   Resp: 19   Temp: 98.2 °F (36.8 °C)   TempSrc: Oral   SpO2: 98%   Weight: 77.1 kg (170 lb)   Height: 5' 1" (1.549 m)     Physical Exam  Vitals signs and nursing note reviewed.   Constitutional:       General: She is not in acute distress.     Appearance: She is well-developed.   Cardiovascular:      Pulses:           Dorsalis pedis pulses are 2+ on the right side and 2+ on the left side.        Posterior tibial pulses are 2+ on the right side and 2+ on the left side.   Pulmonary:      Effort: Pulmonary effort is normal.   Musculoskeletal:         General: Swelling and tenderness present.      Right foot: Decreased range of motion.   Feet:      Right foot:      Skin integrity: Erythema and warmth present.   Skin:     Capillary Refill: Capillary refill takes less than 2 seconds.     Vascular         Normal CFT bilateral   No lower extremity edema bilateral   Pedal skin temperature and color are normal bilateral     Integumentary   Entire right medial foot, heel and ankle tender with moderate edema,  mild erythema and calor       Neurological   Gross sensation intact bilateral feet, positive Ramos's neuritis right      Musculoskeletal   Muscle Strength/Testing and Tone:   Normal strength, but guarding right,  normal tone bilateral   Joints, Bones, and Muscles:  Pain, guarding, limited ROM plantar and posterior right heel, achilles and plantar fascial insertion    AJ equinus right greater than left          EXAMINATION: 8/27/2020  XR FOOT COMPLETE 3 VIEW RIGHT  CLINICAL HISTORY:  Achilles tendinitis, right leg  TECHNIQUE:  AP, lateral, and oblique views of the right foot were performed.  COMPARISON:  01/09/2020  FINDINGS:  No fracture or dislocation.  Moderate dorsal and plantar heel spurs are present.  There are multiple hammertoe deformities.  Impression:  No acute osseous abnormality.  Heel spurs and hammertoe deformities as above      EXAMINATION: 8/27/2020  MRI ANKLE WITHOUT CONTRAST RIGHT  CLINICAL " HISTORY:  Ankle pain, tendon abnormality suspected, neg xray;r/o achilles tendon tear;  Achilles tendinitis, right leg  TECHNIQUE:  Multiplanar, multisequence MR imaging of the right ankle was performed without contrast  COMPARISON:  None  FINDINGS:  The anterior talofibular, calcaneofibular, posterior talofibular, deltoid, spring, and Lisfranc ligaments are intact.     The posterior tibial, flexor hallucis longus, flexor digitorum longus, peroneus longus and brevis, and anterior tendons are intact without evidence for tear, tendinosis, or tenosynovitis.     There is mild fluid signal deep and dorsal to the Achilles tendon at its myotendinous junction.  The Achilles is otherwise unremarkable.     There is moderate thickening of the proximal plantar fascia, accompanied by moderate interstitial tearing of both the medial and lateral bundles proximally near their calcaneal attachments.  These findings are accompanied by mild calcaneal marrow edema at the plantar fascial attachment, small plantar calcaneal spur formation, and mild edema within the surrounding soft tissues.     There is moderate marrow edema within the cuboid bone, without fracture line, and likely reactive in nature.  There is no tibiotalar joint effusion.     Impression:  1. Features of plantar fasciitis, manifest as moderate partial tearing of the plantar fascia near its calcaneal attachment, with associated secondary reactive findings as above.  2. Mild Achilles strain at the myotendinous junction.     Assessment:       1. Nontraumatic tear of plantar fascia - Right Foot    2. Achilles tendinitis of right lower extremity    3. Achilles tendon tear, right, subsequent encounter    4. Plantar fasciitis - Right Foot    5. Calcaneal spur of foot, right    6. Neuritis of right foot        Plan:           XRAY 3 VIEWS RIGHT FOOT   MRI RIGHT ANKLE   18 MG BETAMETHASONE IM RIGHT HIP   30 MG TORADOL IM LEFT HIP    Reviewed with patient's significant amount of  inflammation regarding Achilles tendon, medial ankle/heel /neuritis, plantar fasciitis.  It is difficult to assess origin of pain, patient feels Achilles tendon is point of origin I discussed possibility of a partial rupture with patient and following review of x-rays today with patient recommended MRI  We did review both posterior and plantar calcaneal spurs, however these are present on the left with no pain   Addressed inflammation right heel, swelling, redness and warmth of the right foot/ heel due to length of time this has been present.   Instructed patient do not take any over-the-counter or prescription anti-inflammatory for 48 hr  Instructed patient on ice /cool therapy and frequency this should be performed.  Recommended IM betamethasone and Toradol.  Discussed effectiveness of these medications and decreasing inflammation, difference in these 2 medications, potential side effects and length of time injections may be beneficial.  Advised patient reducing inflammation should allow her to tolerate walking boot more comfortably, utilize arch support in the walking boot  Patient was in understanding and agreement with treatment plan  I counseled the patient on their conditions, implications and medical management.  Instructed patient/family to contact the office with any changes, questions, concerns, worsening of symptoms.     Following completion of MRI performed today patient was contacted regarding partial tears of plantar fascia, instructed to immediately start wearing walking boot with arch support inside, decrease activity, ice and elevate    Total face-to-face time, exam, assessment, treatment, discussion, documentation >40 minutes, more than half this time spent on consultation and coordination of care.  Follow up 1 week       This note was created using [employer] trends voice recognition software that occasionally misinterpreted phrases or words.

## 2020-08-27 NOTE — LETTER
August 27, 2020      Keily Lowe MD  [address] [place] [address]           [facility] - Podiatry/Wound [facility]  [address] [place] [address]  Phone: [phone]  Fax: [phone]          Patient: Judith Reyer   MR Number: [id number]   YOB: 1950   Date of Visit: 8/27/2020       Dear Dr. Keily Lowe:    Thank you for referring Judith Reyer to me for evaluation. Attached you will find relevant portions of my assessment and plan of care.    If you have questions, please do not hesitate to call me. I look forward to following Judith Reyer along with you.    Sincerely,    Cee Noonan, KHANG    Enclosure  CC:  No Recipients    If you would like to receive this communication electronically, please contact [email or web address] or [phone] to request more information on [employer] Link access.    For providers and/or their staff who would like to refer a patient to [facility], please contact us through our one-stop-shop provider referral line, [facility] , at [phone].    If you feel you have received this communication in error or would no longer like to receive these types of communications, please e-mail [email or web address]

## 2020-09-03 ENCOUNTER — OFFICE VISIT (OUTPATIENT)
Dept: PODIATRY | Facility: CLINIC | Age: 70
End: 2020-09-03
Payer: MEDICARE

## 2020-09-03 VITALS
BODY MASS INDEX: 32.1 KG/M2 | OXYGEN SATURATION: 99 % | SYSTOLIC BLOOD PRESSURE: 147 MMHG | HEART RATE: 57 BPM | TEMPERATURE: 98 F | DIASTOLIC BLOOD PRESSURE: 85 MMHG | WEIGHT: 170 LBS | RESPIRATION RATE: 18 BRPM | HEIGHT: 61 IN

## 2020-09-03 DIAGNOSIS — M62.9 NONTRAUMATIC TEAR OF PLANTAR FASCIA: Primary | ICD-10-CM

## 2020-09-03 DIAGNOSIS — G57.91 NEURITIS OF RIGHT FOOT: ICD-10-CM

## 2020-09-03 DIAGNOSIS — M77.31 CALCANEAL SPUR OF FOOT, RIGHT: ICD-10-CM

## 2020-09-03 DIAGNOSIS — M76.61 ACHILLES TENDINITIS OF RIGHT LOWER EXTREMITY: ICD-10-CM

## 2020-09-03 PROCEDURE — 99214 OFFICE O/P EST MOD 30 MIN: CPT | Mod: PBBFAC | Performed by: PODIATRIST

## 2020-09-03 PROCEDURE — 99999 PR PBB SHADOW E&M-EST. PATIENT-LVL IV: CPT | Mod: PBBFAC,,, | Performed by: PODIATRIST

## 2020-09-03 PROCEDURE — 99215 PR OFFICE/OUTPT VISIT, EST, LEVL V, 40-54 MIN: ICD-10-PCS | Mod: S$PBB,,, | Performed by: PODIATRIST

## 2020-09-03 PROCEDURE — 99215 OFFICE O/P EST HI 40 MIN: CPT | Mod: S$PBB,,, | Performed by: PODIATRIST

## 2020-09-03 PROCEDURE — 99999 PR PBB SHADOW E&M-EST. PATIENT-LVL IV: ICD-10-PCS | Mod: PBBFAC,,, | Performed by: PODIATRIST

## 2020-09-03 RX ORDER — LOSARTAN POTASSIUM AND HYDROCHLOROTHIAZIDE 12.5; 1 MG/1; MG/1
1 TABLET ORAL DAILY
COMMUNITY
Start: 2020-08-14 | End: 2022-11-26 | Stop reason: SDUPTHER

## 2020-09-03 RX ORDER — GUAIFENESIN 1200 MG
TABLET, EXTENDED RELEASE 12 HR ORAL
COMMUNITY
Start: 2016-12-12

## 2020-09-03 RX ORDER — NAPROXEN SODIUM 220 MG
TABLET ORAL
COMMUNITY
Start: 2017-09-13 | End: 2020-09-03

## 2020-09-03 RX ORDER — AMLODIPINE BESYLATE 5 MG/1
5 TABLET ORAL NIGHTLY
COMMUNITY
Start: 2020-08-14

## 2020-09-03 RX ORDER — PNV NO.95/FERROUS FUM/FOLIC AC 28MG-0.8MG
2500 TABLET ORAL
COMMUNITY
Start: 2015-11-23 | End: 2020-09-03

## 2020-09-03 RX ORDER — AMLODIPINE BESYLATE 5 MG/1
TABLET ORAL
COMMUNITY
Start: 2018-11-19 | End: 2020-09-03

## 2020-09-03 RX ORDER — FLUTICASONE PROPIONATE 50 MCG
2 SPRAY, SUSPENSION (ML) NASAL DAILY
COMMUNITY
Start: 2020-07-20 | End: 2020-10-02 | Stop reason: SDUPTHER

## 2020-09-03 RX ORDER — LOSARTAN POTASSIUM AND HYDROCHLOROTHIAZIDE 12.5; 5 MG/1; MG/1
1 TABLET ORAL DAILY
COMMUNITY
Start: 2020-08-11 | End: 2020-09-03

## 2020-09-03 RX ORDER — METOPROLOL SUCCINATE 50 MG/1
TABLET, EXTENDED RELEASE ORAL
COMMUNITY
Start: 2019-01-14

## 2020-09-03 RX ORDER — IBUPROFEN 800 MG/1
800 TABLET ORAL 2 TIMES DAILY
COMMUNITY
Start: 2020-07-30 | End: 2020-10-02 | Stop reason: SDUPTHER

## 2020-09-03 RX ORDER — LEVOTHYROXINE SODIUM 50 UG/1
TABLET ORAL
COMMUNITY
Start: 2019-02-18 | End: 2020-09-03

## 2020-09-03 NOTE — LETTER
September 5, 2020      Keily Lowe MD  [address] [facility] [place] [address]           [facility]ry/[facility]ound [facility]  [address] [facility] [facility] [place] [address]  Phone: [phone]  Fax: [phone]          Patient: Judith Reyer   MR Number: [id number]   YOB: 1950   Date of Visit: 9/3/2020       Dear Dr. Keily Lowe:    Thank you for referring Judith Reyer to me for evaluation. Attached you will find relevant portions of my assessment and plan of care.    If you have questions, please do not hesitate to call me. I look forward to following Judith Reyer along with you.    Sincerely,    Cee Noonan, KHANG    Enclosure  CC:  No Recipients    If you would like to receive this communication electronically, please contact [email or web address] or [phone] to request more information on [employer] Link access.    For providers and/or their staff who would like to refer a patient to [facility], please contact us through our one-stop-shop provider referral line, [facility] , at [phone].    If you feel you have received this communication in error or would no longer like to receive these types of communications, please e-mail [email or web address]

## 2020-09-04 NOTE — PROGRESS NOTES
Subjective:       Patient ID: Judith Reyer is a 70 y.o. female.    Chief Complaint: Follow-up and Foot Pain  Patient presents for follow-up Achilles tendinitis and plantar fasciitis right foot.  Patient does confirm she received call from the nurse regarding results of MRI but is slightly confused about location and severity of injury.  She has been wearing the walking boot at all times.   Swelling has just about resolved. She takes Aleve twice a day, this is something she was doing prior to her foot pain.   Relates walking boot is fairly comfortable, is still having pain and is concerned about getting back to activity as soon as possible, states she has gained a lot of weight.    Past Medical History:   Diagnosis Date    Allergy     Hypertension     Hypothyroidism      Past Surgical History:   Procedure Laterality Date    CHOLECYSTECTOMY      FOOT SURGERY      HYSTERECTOMY       Family History   Problem Relation Age of Onset    Stroke Mother     Cirrhosis Father      Social History     Socioeconomic History    Marital status:      Spouse name: Not on file    Number of children: Not on file    Years of education: Not on file    Highest education level: Not on file   Occupational History    Not on file   Social Needs    Financial resource strain: Not on file    Food insecurity     Worry: Not on file     Inability: Not on file    Transportation needs     Medical: Not on file     Non-medical: Not on file   Tobacco Use    Smoking status: Former Smoker    Smokeless tobacco: Never Used    Tobacco comment: 3 years ago quit   Substance and Sexual Activity    Alcohol use: Not Currently    Drug use: Never    Sexual activity: Not Currently   Lifestyle    Physical activity     Days per week: Not on file     Minutes per session: Not on file    Stress: Not on file   Relationships    Social connections     Talks on phone: Not on file     Gets together: Not on file     Attends [employer] service: Not  on file     Active member of club or organization: Not on file     Attends meetings of clubs or organizations: Not on file     Relationship status: Not on file   Other Topics Concern    Not on file   Social History Narrative    Not on file       Current Outpatient Medications   Medication Sig Dispense Refill    acetaminophen (TYLENOL) 325 mg Cap   Oral, 0 Refill(s), Maintenance      amLODIPine (NORVASC) 5 MG tablet Take 5 mg by mouth every evening.      cetirizine (ZYRTEC) 10 MG tablet Take 10 mg by mouth once daily.      fluticasone propionate (FLONASE) 50 mcg/actuation nasal spray 2 sprays by Each Nostril route once daily.      ibuprofen (ADVIL,MOTRIN) 800 MG tablet Take 800 mg by mouth 2 (two) times daily.      losartan-hydrochlorothiazide 100-12.5 mg (HYZAAR) 100-12.5 mg Tab Take 1 tablet by mouth once daily.      metoprolol succinate (TOPROL XL) 50 MG 24 hr tablet   = 1 tab, Oral, Daily, # 90 tab, 2 Refill(s), Maintenance, Pharmacy: [facility]      omega-3 fatty acids/fish oil (FISH OIL-OMEGA-3 FATTY ACIDS) 300-1,000 mg capsule Take 550 capsules by mouth once daily.      pantoprazole (PROTONIX) 40 MG tablet       SYNTHROID 75 mcg tablet       cyclobenzaprine (FLEXERIL) 10 MG tablet        No current facility-administered medications for this visit.      Review of patient's allergies indicates:   Allergen Reactions    Nitrofurantoin      Other reaction(s): Vomiting    Penicillin Rash    Macrodantin [nitrofurantoin macrocrystalline]     Penicillins        Review of Systems   Constitutional: Negative for fever.   HENT: Negative for congestion.    Respiratory: Negative for cough.    Cardiovascular: Negative for leg swelling.   Musculoskeletal: Positive for gait problem.        Walking boot right   All other systems reviewed and are negative.      Objective:      Vitals:    09/03/20 0833   BP: (!) 147/85   Pulse: (!) 57   Resp: 18   Temp: 98.2 °F (36.8 °C)   TempSrc: Oral   SpO2: 99%  "  Weight: 77.1 kg (170 lb)   Height: 5' 1" (1.549 m)     Physical Exam  Vitals signs and nursing note reviewed.   Constitutional:       General: She is not in acute distress.     Appearance: She is well-developed.   Cardiovascular:      Pulses:           Dorsalis pedis pulses are 2+ on the right side and 2+ on the left side.        Posterior tibial pulses are 2+ on the right side and 2+ on the left side.   Pulmonary:      Effort: Pulmonary effort is normal.   Musculoskeletal:         General: Tenderness present. No swelling.      Right foot: Decreased range of motion.   Feet:      Right foot:      Skin integrity: No erythema or warmth.   Skin:     Capillary Refill: Capillary refill takes less than 2 seconds.     Vascular         Normal CFT bilateral   No lower extremity edema bilateral   Pedal skin temperature and color are normal bilateral     Integumentary          Subtle edema medial right heel, no erythema or calor      Neurological   Gross sensation intact bilateral feet, positive Ramos's neuritis right, area is still hypersensitive      Musculoskeletal   Muscle Strength/Testing and Tone:   Normal strength, improved, guarding right,  normal tone bilateral   Joints, Bones, and Muscles:  Pain Achilles tendon insertion and plantar fascial insertion right  AJ equinus right greater than left          EXAMINATION: 8/27/2020  XR FOOT COMPLETE 3 VIEW RIGHT  CLINICAL HISTORY:  Achilles tendinitis, right leg  TECHNIQUE:  AP, lateral, and oblique views of the right foot were performed.  COMPARISON:  01/09/2020  FINDINGS:  No fracture or dislocation.  Moderate dorsal and plantar heel spurs are present.  There are multiple hammertoe deformities.  Impression:  No acute osseous abnormality.  Heel spurs and hammertoe deformities as above      EXAMINATION: 8/27/2020  MRI ANKLE WITHOUT CONTRAST RIGHT  CLINICAL HISTORY:  Ankle pain, tendon abnormality suspected, neg xray;r/o achilles tendon tear;  Achilles tendinitis, right " leg  TECHNIQUE:  Multiplanar, multisequence MR imaging of the right ankle was performed without contrast  COMPARISON:  None  FINDINGS:  The anterior talofibular, calcaneofibular, posterior talofibular, deltoid, spring, and Lisfranc ligaments are intact.     The posterior tibial, flexor hallucis longus, flexor digitorum longus, peroneus longus and brevis, and anterior tendons are intact without evidence for tear, tendinosis, or tenosynovitis.     There is mild fluid signal deep and dorsal to the Achilles tendon at its myotendinous junction.  The Achilles is otherwise unremarkable.     There is moderate thickening of the proximal plantar fascia, accompanied by moderate interstitial tearing of both the medial and lateral bundles proximally near their calcaneal attachments.  These findings are accompanied by mild calcaneal marrow edema at the plantar fascial attachment, small plantar calcaneal spur formation, and mild edema within the surrounding soft tissues.     There is moderate marrow edema within the cuboid bone, without fracture line, and likely reactive in nature.  There is no tibiotalar joint effusion.     Impression:  1. Features of plantar fasciitis, manifest as moderate partial tearing of the plantar fascia near its calcaneal attachment, with associated secondary reactive findings as above.  2. Mild Achilles strain at the myotendinous junction.     Assessment:       1. Nontraumatic tear of plantar fascia - Right Foot    2. Achilles tendinitis of right lower extremity    3. Calcaneal spur of foot, right    4. Neuritis of right foot        Plan:          PHYSICAL THERAPY     Had a lengthy discussion with patient regarding medial and lateral plantar fascial tears and showed patient location on foot model.  She still is having residual Achilles tendon pain as well as neuritis inside of the heel back along the Achilles tendon.  We discussed Achilles tendon, plantar fascia and neuritis in detail  Dispensed copy of  MRI  Advised patient treatments for all of these conditions is to remain immobilized, however she must get arch support in her walking boot.  Walking boot needs to be made comfortable so she is 100% pain-free while in it.  Patient did not have her power steps in the walking boot today and instructed to remove from tennis shoe at home and start using 1 in the boot, other 1 in tennis shoe left foot.   Patient was fitted for and ankle compro.  Dispensed Ace wrap.  Instructed patient to utilize these to help with compression, support, swelling and so she is properly and comfortably fitted into walking boot.   advised patient reducing inflammation is crucial, she can continue Aleve twice daily, with meals, discontinue if stomach upset.  In addition utilize ice /cool therapy 20 min on, 20 min off and repeat 3-4 times daily  Ordered physical therapy  Patient is to remain in walking boot at all times of weight-bearing.  We may consider cortisone injection if she is not able to comfortably tolerate walking boot  Patient was in understanding and agreement with treatment plan  I counseled the patient on their conditions, implications and medical management.  Instructed patient/family to contact the office with any changes, questions, concerns, worsening of symptoms.   Total face-to-face time, exam, assessment, treatment, discussion, documentation >40 minutes, more than half this time spent on consultation and coordination of care.  Follow up 1 week    Following our visit today I went outside with patient to speak to her daughter regarding MRI results, location of tears, utilizing walking boot at all times, treatments to support her foot while in the boot, treatments for inflammation and physical therapy    This note was created using [email or web address] voice recognition software that occasionally misinterpreted phrases or words.

## 2020-09-17 ENCOUNTER — OFFICE VISIT (OUTPATIENT)
Dept: PODIATRY | Facility: CLINIC | Age: 70
End: 2020-09-17
Payer: MEDICARE

## 2020-09-17 VITALS
HEART RATE: 60 BPM | WEIGHT: 170 LBS | OXYGEN SATURATION: 99 % | TEMPERATURE: 97 F | DIASTOLIC BLOOD PRESSURE: 84 MMHG | HEIGHT: 61 IN | SYSTOLIC BLOOD PRESSURE: 150 MMHG | RESPIRATION RATE: 20 BRPM | BODY MASS INDEX: 32.1 KG/M2

## 2020-09-17 DIAGNOSIS — M72.2 PLANTAR FASCIITIS: Primary | ICD-10-CM

## 2020-09-17 DIAGNOSIS — M76.61 ACHILLES TENDINITIS OF RIGHT LOWER EXTREMITY: ICD-10-CM

## 2020-09-17 DIAGNOSIS — G57.91 NEURITIS OF RIGHT FOOT: ICD-10-CM

## 2020-09-17 PROCEDURE — 99999 PR PBB SHADOW E&M-EST. PATIENT-LVL IV: ICD-10-PCS | Mod: PBBFAC,,, | Performed by: PODIATRIST

## 2020-09-17 PROCEDURE — 99214 PR OFFICE/OUTPT VISIT, EST, LEVL IV, 30-39 MIN: ICD-10-PCS | Mod: S$PBB,,, | Performed by: PODIATRIST

## 2020-09-17 PROCEDURE — 99214 OFFICE O/P EST MOD 30 MIN: CPT | Mod: S$PBB,,, | Performed by: PODIATRIST

## 2020-09-17 PROCEDURE — 99214 OFFICE O/P EST MOD 30 MIN: CPT | Mod: PBBFAC | Performed by: PODIATRIST

## 2020-09-17 PROCEDURE — 99999 PR PBB SHADOW E&M-EST. PATIENT-LVL IV: CPT | Mod: PBBFAC,,, | Performed by: PODIATRIST

## 2020-09-17 NOTE — LETTER
September 19, 2020      Keily Lowe MD  [address] [facility] [place] [address]           [facility] - Podiatry/Wound Care  [address] [place] [address]  Phone: [phone]  Fax: [phone]          Patient: Judith Reyer   MR Number: [id number]   YOB: 1950   Date of Visit: 9/17/2020       Dear Dr. Keily Lowe:    Thank you for referring Judith Reyer to me for evaluation. Attached you will find relevant portions of my assessment and plan of care.    If you have questions, please do not hesitate to call me. I look forward to following Judith Reyer along with you.    Sincerely,    Cee Noonan, KHANG    Enclosure  CC:  No Recipients    If you would like to receive this communication electronically, please contact [email or web address] or [phone] to request more information on [employer] Link access.    For providers and/or their staff who would like to refer a patient to [facility], please contact us through our one-stop-shop provider referral line, [facility] , at [phone].    If you feel you have received this communication in error or would no longer like to receive these types of communications, please e-mail [email or web address]

## 2020-09-19 NOTE — PROGRESS NOTES
Subjective:       Patient ID: Judith Reyer is a 70 y.o. female.    Chief Complaint: Follow-up and Plantar Fasciitis  Patient presents for follow-up plantar fasciitis right foot.   Has been wearing walking boot with arch support, taking Aleve 2 tabs twice daily.  She has custom discontinued all other prescription and over-the-counter NSAIDs.   She reports no change really since last visit, boot is comfortable, wearing ankle compro as well which she states is comfortable and helps with swelling, but her whole foot is sensitive and has heel pain when she tries to wear her tennis shoe. States she is applying ice and stretching.       Past Medical History:   Diagnosis Date    Allergy     Hypertension     Hypothyroidism      Past Surgical History:   Procedure Laterality Date    CHOLECYSTECTOMY      FOOT SURGERY      HYSTERECTOMY       Family History   Problem Relation Age of Onset    Stroke Mother     Cirrhosis Father      Social History     Socioeconomic History    Marital status:      Spouse name: Not on file    Number of children: Not on file    Years of education: Not on file    Highest education level: Not on file   Occupational History    Not on file   Social Needs    Financial resource strain: Not on file    Food insecurity     Worry: Not on file     Inability: Not on file    Transportation needs     Medical: Not on file     Non-medical: Not on file   Tobacco Use    Smoking status: Former Smoker    Smokeless tobacco: Never Used    Tobacco comment: 3 years ago quit   Substance and Sexual Activity    Alcohol use: Not Currently    Drug use: Never    Sexual activity: Not Currently   Lifestyle    Physical activity     Days per week: Not on file     Minutes per session: Not on file    Stress: Not on file   Relationships    Social connections     Talks on phone: Not on file     Gets together: Not on file     Attends Nondenominational service: Not on file     Active member of club or organization:  "Not on file     Attends meetings of clubs or organizations: Not on file     Relationship status: Not on file   Other Topics Concern    Not on file   Social History Narrative    Not on file       Current Outpatient Medications   Medication Sig Dispense Refill    acetaminophen (TYLENOL) 325 mg Cap   Oral, 0 Refill(s), Maintenance      amLODIPine (NORVASC) 5 MG tablet Take 5 mg by mouth every evening.      cetirizine (ZYRTEC) 10 MG tablet Take 10 mg by mouth once daily.      cyclobenzaprine (FLEXERIL) 10 MG tablet       fluticasone propionate (FLONASE) 50 mcg/actuation nasal spray 2 sprays by Each Nostril route once daily.      ibuprofen (ADVIL,MOTRIN) 800 MG tablet Take 800 mg by mouth 2 (two) times daily.      losartan-hydrochlorothiazide 100-12.5 mg (HYZAAR) 100-12.5 mg Tab Take 1 tablet by mouth once daily.      metoprolol succinate (TOPROL XL) 50 MG 24 hr tablet   = 1 tab, Oral, Daily, # 90 tab, 2 Refill(s), Maintenance, Pharmacy: [id number]      omega-3 fatty acids/fish oil (FISH OIL-OMEGA-3 FATTY ACIDS) 300-1,000 mg capsule Take 550 capsules by mouth once daily.      pantoprazole (PROTONIX) 40 MG tablet       SYNTHROID 75 mcg tablet        No current facility-administered medications for this visit.      Review of patient's allergies indicates:   Allergen Reactions    Nitrofurantoin      Other reaction(s): Vomiting    Penicillin Rash    Macrodantin [nitrofurantoin macrocrystalline]     Penicillins        Review of Systems   Constitutional: Negative for fever.   HENT: Negative for congestion.    Respiratory: Negative for cough.    Cardiovascular: Negative for leg swelling.   Musculoskeletal: Positive for gait problem.        Walking boot right   All other systems reviewed and are negative.      Objective:      Vitals:    09/17/20 0830   BP: (!) 150/84   Pulse: 60   Resp: 20   Temp: 97.1 °F (36.2 °C)   TempSrc: Temporal   SpO2: 99%   Weight: 77.1 kg (170 lb)   Height: 5' 1" (1.549 m) "     Physical Exam  Vitals signs and nursing note reviewed.   Constitutional:       General: She is not in acute distress.     Appearance: She is well-developed.   Cardiovascular:      Pulses:           Dorsalis pedis pulses are 2+ on the right side and 2+ on the left side.        Posterior tibial pulses are 2+ on the right side and 2+ on the left side.   Pulmonary:      Effort: Pulmonary effort is normal.   Musculoskeletal:         General: Tenderness present. No swelling.      Right foot: Decreased range of motion.   Feet:      Right foot:      Skin integrity: No erythema.   Skin:     Capillary Refill: Capillary refill takes less than 2 seconds.   Neurological:      General: No focal deficit present.   Psychiatric:         Mood and Affect: Mood normal.         Behavior: Behavior normal.     Vascular         Normal CFT bilateral   No lower extremity edema bilateral   Pedal skin temperature and color are normal bilateral     Integumentary          Subtle edema medial right heel, no erythema or calor      Neurological   Gross sensation intact bilateral feet, positive Ramos's neuritis right, area is still hypersensitive      Musculoskeletal   Muscle Strength/Testing and Tone:   Normal strength, improved, mild guarding right,  normal tone bilateral   Joints, Bones, and Muscles:  Tender achilles tendon insertion and plantar fascial insertion right  AJ equinus right greater than left          EXAMINATION: 8/27/2020  XR FOOT COMPLETE 3 VIEW RIGHT  CLINICAL HISTORY:  Achilles tendinitis, right leg  TECHNIQUE:  AP, lateral, and oblique views of the right foot were performed.  COMPARISON:  01/09/2020  FINDINGS:  No fracture or dislocation.  Moderate dorsal and plantar heel spurs are present.  There are multiple hammertoe deformities.  Impression:  No acute osseous abnormality.  Heel spurs and hammertoe deformities as above      EXAMINATION: 8/27/2020  MRI ANKLE WITHOUT CONTRAST RIGHT  CLINICAL HISTORY:  Ankle pain, tendon  abnormality suspected, neg xray;r/o achilles tendon tear;  Achilles tendinitis, right leg  TECHNIQUE:  Multiplanar, multisequence MR imaging of the right ankle was performed without contrast  COMPARISON:  None  FINDINGS:  The anterior talofibular, calcaneofibular, posterior talofibular, deltoid, spring, and Lisfranc ligaments are intact.     The posterior tibial, flexor hallucis longus, flexor digitorum longus, peroneus longus and brevis, and anterior tendons are intact without evidence for tear, tendinosis, or tenosynovitis.     There is mild fluid signal deep and dorsal to the Achilles tendon at its myotendinous junction.  The Achilles is otherwise unremarkable.     There is moderate thickening of the proximal plantar fascia, accompanied by moderate interstitial tearing of both the medial and lateral bundles proximally near their calcaneal attachments.  These findings are accompanied by mild calcaneal marrow edema at the plantar fascial attachment, small plantar calcaneal spur formation, and mild edema within the surrounding soft tissues.     There is moderate marrow edema within the cuboid bone, without fracture line, and likely reactive in nature.  There is no tibiotalar joint effusion.     Impression:  1. Features of plantar fasciitis, manifest as moderate partial tearing of the plantar fascia near its calcaneal attachment, with associated secondary reactive findings as above.  2. Mild Achilles strain at the myotendinous junction.     Assessment:       1. Plantar fasciitis - Right Foot    2. Achilles tendinitis of right lower extremity    3. Neuritis of right foot        Plan:            Reviewed MRI results again with patient, discussed plantar fascial tears, neuritis, achilles tendon and advised plantar fasciitis is most likely the origin of her pain  We discussed cortisone injection in the heel, patient declined  Patient relates she will consider injection in 3 weeks if not further improvement  Dispensed  samples XL compro.  Instructed patient she has always to remove any compression which is too tight which can contribute to neuritis pain.  Patient verbalized understanding  Continue boot with arch support right, tennis shoe with arch left.  Continue Aleve twice daily, with meals, discontinue if stomach upset.    Appliy ice /cool therapy 20 min on, 20 min off and repeat 3-4 times daily  Patient was in understanding and agreement with treatment plan  I counseled the patient on their conditions, implications and medical management.  Instructed patient/family to contact the office with any changes, questions, concerns, worsening of symptoms.   Total face-to-face time, exam, assessment, treatment, discussion, documentation 25 minutes, more than half this time spent on consultation and coordination of care.  Follow up 2 week    Following our visit today I went outside with patient to speak to her daughter regarding MRI results, location of tears, utilizing walking boot at all times, treatments to support her foot while in the boot, treatments for inflammation and physical therapy    This note was created using M*Modal voice recognition software that occasionally misinterpreted phrases or words.

## 2020-10-02 ENCOUNTER — OFFICE VISIT (OUTPATIENT)
Dept: PODIATRY | Facility: CLINIC | Age: 70
End: 2020-10-02
Payer: MEDICARE

## 2020-10-02 VITALS
WEIGHT: 170 LBS | SYSTOLIC BLOOD PRESSURE: 141 MMHG | HEIGHT: 61 IN | TEMPERATURE: 97 F | OXYGEN SATURATION: 99 % | BODY MASS INDEX: 32.1 KG/M2 | HEART RATE: 61 BPM | DIASTOLIC BLOOD PRESSURE: 78 MMHG | RESPIRATION RATE: 19 BRPM

## 2020-10-02 DIAGNOSIS — G57.91 NEURITIS OF RIGHT FOOT: ICD-10-CM

## 2020-10-02 DIAGNOSIS — M72.2 PLANTAR FASCIITIS: Primary | ICD-10-CM

## 2020-10-02 DIAGNOSIS — M76.61 ACHILLES TENDINITIS OF RIGHT LOWER EXTREMITY: ICD-10-CM

## 2020-10-02 PROCEDURE — 99999 PR PBB SHADOW E&M-EST. PATIENT-LVL IV: ICD-10-PCS | Mod: PBBFAC,,, | Performed by: PODIATRIST

## 2020-10-02 PROCEDURE — 99999 PR PBB SHADOW E&M-EST. PATIENT-LVL IV: CPT | Mod: PBBFAC,,, | Performed by: PODIATRIST

## 2020-10-02 PROCEDURE — 99214 OFFICE O/P EST MOD 30 MIN: CPT | Mod: 25,S$PBB,, | Performed by: PODIATRIST

## 2020-10-02 PROCEDURE — 96372 THER/PROPH/DIAG INJ SC/IM: CPT | Mod: PBBFAC,RT

## 2020-10-02 PROCEDURE — 99214 OFFICE O/P EST MOD 30 MIN: CPT | Mod: PBBFAC,25 | Performed by: PODIATRIST

## 2020-10-02 PROCEDURE — 99214 PR OFFICE/OUTPT VISIT, EST, LEVL IV, 30-39 MIN: ICD-10-PCS | Mod: 25,S$PBB,, | Performed by: PODIATRIST

## 2020-10-02 RX ORDER — FERROUS SULFATE, DRIED 160(50) MG
TABLET, EXTENDED RELEASE ORAL
COMMUNITY
Start: 2015-11-23

## 2020-10-02 RX ORDER — IBUPROFEN 800 MG/1
TABLET ORAL
COMMUNITY
Start: 2018-03-22

## 2020-10-02 RX ORDER — CHOLECALCIFEROL (VITAMIN D3) 50 MCG
CAPSULE ORAL
COMMUNITY
Start: 2017-09-13

## 2020-10-02 RX ORDER — CYCLOBENZAPRINE HCL 10 MG
TABLET ORAL
COMMUNITY
Start: 2018-09-24 | End: 2020-10-02 | Stop reason: SDUPTHER

## 2020-10-02 RX ORDER — FLUTICASONE PROPIONATE 50 MCG
2 SPRAY, SUSPENSION (ML) NASAL
COMMUNITY
Start: 2018-11-19

## 2020-10-02 RX ORDER — KETOROLAC TROMETHAMINE 30 MG/ML
30 INJECTION, SOLUTION INTRAMUSCULAR; INTRAVENOUS
Status: COMPLETED | OUTPATIENT
Start: 2020-10-02 | End: 2020-10-02

## 2020-10-02 RX ADMIN — KETOROLAC TROMETHAMINE 30 MG: 30 INJECTION, SOLUTION INTRAMUSCULAR; INTRAVENOUS at 09:10

## 2020-10-02 NOTE — LETTER
October 5, 2020      Keily Lowe MD  [address] [place] [address]           [facility]  [address] [place] [address]  Phone: [phone]  Fax: [phone]          Patient: Judith Reyer   MR Number: [id number]   YOB: 1950   Date of Visit: 10/2/2020       Dear Dr. Keily Lowe:    Thank you for referring Judith Reyer to me for evaluation. Attached you will find relevant portions of my assessment and plan of care.    If you have questions, please do not hesitate to call me. I look forward to following Judith Reyer along with you.    Sincerely,    Cee Noonan, KHANG    Enclosure  CC:  No Recipients    If you would like to receive this communication electronically, please contact [email or web address] or [phone] to request more information on [employer] Link access.    For providers and/or their staff who would like to refer a patient to [facility], please contact us through our one-stop-shop provider referral line, [facility] , at [phone].    If you feel you have received this communication in error or would no longer like to receive these types of communications, please e-mail [email or web address]

## 2020-10-05 NOTE — PROGRESS NOTES
Subjective:       Patient ID: Judith Reyer is a 70 y.o. female.    Chief Complaint: Follow-up and Plantar Fasciitis  Patient presents for follow-up plantar fasciitis,  achilles tendinitis, neuritis right foot.   Has been wearing walking boot with arch support, taking Aleve 2 tabs twice daily,  and applying heating pad. Relates she never really had any relief applying cold and it was uncomfortable, after applying heat she noticed improvement and better range of motion.   States there has been improvement since last visit.      Past Medical History:   Diagnosis Date    Allergy     Hypertension     Hypothyroidism      Past Surgical History:   Procedure Laterality Date    CHOLECYSTECTOMY      FOOT SURGERY      HYSTERECTOMY       Family History   Problem Relation Age of Onset    Stroke Mother     Cirrhosis Father      Social History     Socioeconomic History    Marital status:      Spouse name: Not on file    Number of children: Not on file    Years of education: Not on file    Highest education level: Not on file   Occupational History    Not on file   Social Needs    Financial resource strain: Not on file    Food insecurity     Worry: Not on file     Inability: Not on file    Transportation needs     Medical: Not on file     Non-medical: Not on file   Tobacco Use    Smoking status: Former Smoker    Smokeless tobacco: Never Used    Tobacco comment: 3 years ago quit   Substance and Sexual Activity    Alcohol use: Not Currently    Drug use: Never    Sexual activity: Not Currently   Lifestyle    Physical activity     Days per week: Not on file     Minutes per session: Not on file    Stress: Not on file   Relationships    Social connections     Talks on phone: Not on file     Gets together: Not on file     Attends Oriental orthodox service: Not on file     Active member of club or organization: Not on file     Attends meetings of clubs or organizations: Not on file     Relationship status: Not on  file   Other Topics Concern    Not on file   Social History Narrative    Not on file       Current Outpatient Medications   Medication Sig Dispense Refill    acetaminophen (TYLENOL) 325 mg Cap   Oral, 0 Refill(s), Maintenance      amLODIPine (NORVASC) 5 MG tablet Take 5 mg by mouth every evening.      calcium-vitamin D3 (CALCIUM 500 + D) 500 mg(1,250mg) -200 unit per tablet   1 tab, Chewed, Daily, 0 Refill(s)      cetirizine (ZYRTEC) 10 MG tablet Take 10 mg by mouth once daily.      cyclobenzaprine (FLEXERIL) 10 MG tablet       fluticasone propionate (FLONASE ALLERGY RELIEF) 50 mcg/actuation nasal spray 2 sprays.      ibuprofen (ADVIL,MOTRIN) 800 MG tablet   = 1 tab, Oral, q8h, # 30 tab, 3 Refill(s), Maintenance, Pharmacy: [id number]      losartan-hydrochlorothiazide 100-12.5 mg (HYZAAR) 100-12.5 mg Tab Take 1 tablet by mouth once daily.      metoprolol succinate (TOPROL XL) 50 MG 24 hr tablet   = 1 tab, Oral, Daily, # 90 tab, 2 Refill(s), Maintenance, Pharmacy: [id number]      omega 3-dha-epa-fish oil (FISH OIL) 100-160-1,000 mg Cap   1 cap, Oral, Daily, # 100 cap, 0 Refill(s)      omega-3 fatty acids/fish oil (FISH OIL-OMEGA-3 FATTY ACIDS) 300-1,000 mg capsule Take 550 capsules by mouth once daily.      pantoprazole (PROTONIX) 40 MG tablet       SYNTHROID 75 mcg tablet        No current facility-administered medications for this visit.      Review of patient's allergies indicates:   Allergen Reactions    Nitrofurantoin      Other reaction(s): Vomiting    Penicillin Rash    Macrodantin [nitrofurantoin macrocrystalline]     Penicillins        Review of Systems   Constitutional: Negative for fever.   HENT: Negative for congestion.    Respiratory: Negative for cough and shortness of breath.    Cardiovascular: Negative for leg swelling.   Musculoskeletal: Positive for gait problem.        Walking boot right   All other systems reviewed and are negative.      Objective:     "  Vitals:    10/02/20 0837   BP: (!) 141/78   Pulse: 61   Resp: 19   Temp: 97.2 °F (36.2 °C)   TempSrc: Temporal   SpO2: 99%   Weight: 77.1 kg (170 lb)   Height: 5' 1" (1.549 m)     Physical Exam  Vitals signs and nursing note reviewed.   Constitutional:       General: She is not in acute distress.     Appearance: She is well-developed.   Cardiovascular:      Pulses:           Dorsalis pedis pulses are 2+ on the right side and 2+ on the left side.        Posterior tibial pulses are 2+ on the right side and 2+ on the left side.   Pulmonary:      Effort: Pulmonary effort is normal.   Musculoskeletal:         General: Tenderness present. No swelling.      Right foot: Decreased range of motion.   Skin:     Capillary Refill: Capillary refill takes less than 2 seconds.   Psychiatric:         Behavior: Behavior normal.     Vascular         Normal CFT bilateral   No lower extremity edema bilateral   Pedal skin temperature and color are normal bilateral     Integumentary          Subtle edema medial right heel     Neurological   Gross sensation intact bilateral feet, improved Ramos's neuritis right      Musculoskeletal   Muscle Strength/Testing:   Normal strength, no guarding right  Joints, Bones, and Muscles:  Tender achilles tendon insertion and plantar fascial insertion right  AJ equinus right greater than left  Boot right, tennis shoe left              EXAMINATION: 8/27/2020  MRI ANKLE WITHOUT CONTRAST RIGHT  CLINICAL HISTORY:  Ankle pain, tendon abnormality suspected, neg xray;r/o achilles tendon tear;  Achilles tendinitis, right leg  TECHNIQUE:  Multiplanar, multisequence MR imaging of the right ankle was performed without contrast  COMPARISON:  None  FINDINGS:  The anterior talofibular, calcaneofibular, posterior talofibular, deltoid, spring, and Lisfranc ligaments are intact.     The posterior tibial, flexor hallucis longus, flexor digitorum longus, peroneus longus and brevis, and anterior tendons are intact without " evidence for tear, tendinosis, or tenosynovitis.     There is mild fluid signal deep and dorsal to the Achilles tendon at its myotendinous junction.  The Achilles is otherwise unremarkable.     There is moderate thickening of the proximal plantar fascia, accompanied by moderate interstitial tearing of both the medial and lateral bundles proximally near their calcaneal attachments.  These findings are accompanied by mild calcaneal marrow edema at the plantar fascial attachment, small plantar calcaneal spur formation, and mild edema within the surrounding soft tissues.     There is moderate marrow edema within the cuboid bone, without fracture line, and likely reactive in nature.  There is no tibiotalar joint effusion.     Impression:  1. Features of plantar fasciitis, manifest as moderate partial tearing of the plantar fascia near its calcaneal attachment, with associated secondary reactive findings as above.  2. Mild Achilles strain at the myotendinous junction.     Assessment:       1. Plantar fasciitis - Right Foot    2. Achilles tendinitis of right lower extremity    3. Neuritis of right foot        Plan:          30 MG IM TORADOL RIGHT SIDE    Advised patient better range of motion and less pain is a good sign, however these areas are  today with notable swelling, however subtle, on the bottom of the right heel.  Advised patient as she has long as she continues to improve she can continue conservative treatments.  At this point I would recommend she continue all treatments utilizing walking boot for 1 more week and then start to gradually transition into tennis shoe.  Explained this should be done in the morning, while at home, for an hour or 2 and as long as comfortable gradually increase the amount of time she is in tennis shoes with arch support until she can wear them for a full day comfortably    we discussed IM Toradol injection and patient confirms she did not take Aleve this morning.  We  discussed Toradol, effectiveness injury crease thing inflammation, potential side effects, no Aleve for 48 hr.    Explained if she is not able to come out of the walking boot comfortably and the next 1-2 weeks I would recommend injection right heel  Can resume Aleve twice daily in 2 days, with meals, discontinue if stomach upset. Advised if she continues to improve over the next next week would recommend she decrease Aleve to 1 daily.  Explained she should not be on this medication long term, if it is needed for other conditions she needs to discuss this with her PCP    Continue heating pad and stretching  Recommended over-the-counter topical anti-inflammatory 3 times daily to the right heel  Patient was in understanding and agreement with treatment plan  I counseled the patient on their conditions, implications and medical management.  Instructed patient/family to contact the office with any changes, questions, concerns, worsening of symptoms.   Total face-to-face time, exam, assessment, treatment, discussion, documentation 25 minutes, more than half this time spent on consultation and coordination of care.  Follow up 2 week    This note was created using M*Modal voice recognition software that occasionally misinterpreted phrases or words.

## 2022-11-02 ENCOUNTER — TELEPHONE (OUTPATIENT)
Dept: PODIATRY | Facility: CLINIC | Age: 72
End: 2022-11-02
Payer: MEDICARE

## 2022-11-02 NOTE — TELEPHONE ENCOUNTER
Spoke with patient and scheduled appointment.  ----- Message from Nick Maguire sent at 11/2/2022  3:56 PM CDT -----  Contact: Patient  Type:  Patient Call          Who Called: Patient         Does the patient know what this is regarding?: requesting a call back about a condition that stated with her leg now her foot is swollen ; Pt said she want to now if she should be seen ;  please advise           Would the patient rather a call back or a response via [facility]? Call           Best Call Back Number:[phone]             Additional Information:

## 2022-11-10 ENCOUNTER — OFFICE VISIT (OUTPATIENT)
Dept: PODIATRY | Facility: CLINIC | Age: 72
End: 2022-11-10
Payer: MEDICARE

## 2022-11-10 VITALS
RESPIRATION RATE: 18 BRPM | WEIGHT: 170.63 LBS | DIASTOLIC BLOOD PRESSURE: 93 MMHG | HEIGHT: 61 IN | BODY MASS INDEX: 32.22 KG/M2 | HEART RATE: 63 BPM | SYSTOLIC BLOOD PRESSURE: 174 MMHG

## 2022-11-10 DIAGNOSIS — M79.671 PAIN IN BOTH FEET: ICD-10-CM

## 2022-11-10 DIAGNOSIS — G57.81 NEUROMA OF THIRD INTERSPACE OF RIGHT FOOT: ICD-10-CM

## 2022-11-10 DIAGNOSIS — G57.62 NEUROMA OF SECOND INTERSPACE OF LEFT FOOT: Primary | ICD-10-CM

## 2022-11-10 DIAGNOSIS — G57.92 NEURITIS OF LEFT FOOT: ICD-10-CM

## 2022-11-10 DIAGNOSIS — M79.672 PAIN IN BOTH FEET: ICD-10-CM

## 2022-11-10 DIAGNOSIS — R60.0 EDEMA OF LEFT FOOT: ICD-10-CM

## 2022-11-10 PROCEDURE — 99999 PR PBB SHADOW E&M-EST. PATIENT-LVL III: CPT | Mod: PBBFAC,,, | Performed by: PODIATRIST

## 2022-11-10 PROCEDURE — 99999 PR PBB SHADOW E&M-EST. PATIENT-LVL III: ICD-10-PCS | Mod: PBBFAC,,, | Performed by: PODIATRIST

## 2022-11-10 PROCEDURE — 99214 PR OFFICE/OUTPT VISIT, EST, LEVL IV, 30-39 MIN: ICD-10-PCS | Mod: S$PBB,,, | Performed by: PODIATRIST

## 2022-11-10 PROCEDURE — 99213 OFFICE O/P EST LOW 20 MIN: CPT | Mod: PBBFAC,25 | Performed by: PODIATRIST

## 2022-11-10 PROCEDURE — 99214 OFFICE O/P EST MOD 30 MIN: CPT | Mod: S$PBB,,, | Performed by: PODIATRIST

## 2022-11-10 PROCEDURE — 96372 THER/PROPH/DIAG INJ SC/IM: CPT | Mod: PBBFAC

## 2022-11-10 RX ORDER — ATORVASTATIN CALCIUM 40 MG/1
40 TABLET, FILM COATED ORAL DAILY
COMMUNITY
Start: 2022-05-12

## 2022-11-10 RX ORDER — DICLOFENAC SODIUM 10 MG/G
2 GEL TOPICAL 3 TIMES DAILY
Qty: 100 G | Refills: 2 | Status: SHIPPED | OUTPATIENT
Start: 2022-11-10

## 2022-11-10 RX ORDER — FUROSEMIDE 40 MG/1
1 TABLET ORAL DAILY
COMMUNITY
Start: 2022-05-12 | End: 2022-11-26 | Stop reason: SDUPTHER

## 2022-11-10 RX ORDER — POTASSIUM CHLORIDE 750 MG/1
1 TABLET, EXTENDED RELEASE ORAL DAILY
COMMUNITY
Start: 2022-05-12 | End: 2022-12-06

## 2022-11-10 RX ORDER — BETAMETHASONE SODIUM PHOSPHATE AND BETAMETHASONE ACETATE 3; 3 MG/ML; MG/ML
18 INJECTION, SUSPENSION INTRA-ARTICULAR; INTRALESIONAL; INTRAMUSCULAR; SOFT TISSUE
Status: COMPLETED | OUTPATIENT
Start: 2022-11-10 | End: 2022-11-10

## 2022-11-10 RX ORDER — ASPIRIN 81 MG/1
81 TABLET ORAL DAILY
COMMUNITY
Start: 2022-02-02

## 2022-11-10 RX ADMIN — BETAMETHASONE ACETATE AND BETAMETHASONE SODIUM PHOSPHATE 18 MG: 3; 3 INJECTION, SUSPENSION INTRA-ARTICULAR; INTRALESIONAL; INTRAMUSCULAR; SOFT TISSUE at 08:11

## 2022-11-12 NOTE — PROGRESS NOTES
"Subjective:       Patient ID: Judith Reyer is a 72 y.o. female.    Chief Complaint: Follow-up and Foot Pain  Patient presents with complaint of pain on the top of the left foot which has progressed over the last month.  Patient relates it feels like sleep pain." Numb, burning, tingling or wake her up in the middle of the night with pain from her toes to her hip.  She denies any change in shoes activity.  We last saw her for plantar fasciitis and Achilles tendinitis of the right side which she states resolved without complication.  She has not had any pain in these areas but she is starting to experience pain on the top of the right foot as well, similar to how her current pain started 4 weeks ago.  Pain level left foot 8/10      Past Medical History:   Diagnosis Date    Allergy     Hypertension     Hypothyroidism      Past Surgical History:   Procedure Laterality Date    CHOLECYSTECTOMY      FOOT SURGERY      HYSTERECTOMY       Family History   Problem Relation Age of Onset    Stroke Mother     Cirrhosis Father      Social History     Socioeconomic History    Marital status:    Tobacco Use    Smoking status: Former    Smokeless tobacco: Never    Tobacco comments:     3 years ago quit   Substance and Sexual Activity    Alcohol use: Not Currently    Drug use: Never    Sexual activity: Not Currently       Current Outpatient Medications   Medication Sig Dispense Refill    acetaminophen 325 mg Cap   Oral, 0 Refill(s), Maintenance      amLODIPine (NORVASC) 5 MG tablet Take 5 mg by mouth every evening.      aspirin (ECOTRIN) 81 MG EC tablet Take 81 mg by mouth once daily.      atorvastatin (LIPITOR) 40 MG tablet Take 40 mg by mouth once daily.      cetirizine (ZYRTEC) 10 MG tablet Take 10 mg by mouth once daily.      cyclobenzaprine (FLEXERIL) 10 MG tablet       fluticasone propionate (FLONASE) 50 mcg/actuation nasal spray 2 sprays.      furosemide (LASIX) 40 MG tablet Take 1 tablet by mouth once daily.      " "losartan-hydrochlorothiazide 100-12.5 mg (HYZAAR) 100-12.5 mg Tab Take 1 tablet by mouth once daily.      metoprolol succinate (TOPROL-XL) 50 MG 24 hr tablet   = 1 tab, Oral, Daily, # 90 tab, 2 Refill(s), Maintenance, Pharmacy: [id number]      pantoprazole (PROTONIX) 40 MG tablet       potassium chloride (KLOR-CON) 10 MEQ TbSR Take 1 tablet by mouth once daily.      SYNTHROID 75 mcg tablet       calcium-vitamin D3 (OS-ZEFERINO 500 + D3) 500 mg-5 mcg (200 unit) per tablet   1 tab, Chewed, Daily, 0 Refill(s)      diclofenac sodium (VOLTAREN) 1 % Gel Apply 2 g topically 3 (three) times daily. 100 g 2    ibuprofen (ADVIL,MOTRIN) 800 MG tablet   = 1 tab, Oral, q8h, # 30 tab, 3 Refill(s), Maintenance, Pharmacy: [id number]      omega 3-dha-epa-fish oil (FISH OIL) 100-160-1,000 mg Cap   1 cap, Oral, Daily, # 100 cap, 0 Refill(s)      omega-3 fatty acids/fish oil (FISH OIL-OMEGA-3 FATTY ACIDS) 300-1,000 mg capsule Take 550 capsules by mouth once daily.       No current facility-administered medications for this visit.     Review of patient's allergies indicates:   Allergen Reactions    Nitrofurantoin      Other reaction(s): Vomiting    Penicillin Rash    Iodine     Macrodantin [nitrofurantoin macrocrystalline]     Penicillins        Review of Systems   HENT:  Negative for congestion.    Respiratory:  Negative for cough and shortness of breath.    Cardiovascular:  Negative for leg swelling.   All other systems reviewed and are negative.    Objective:      Vitals:    11/10/22 0817   BP: (!) 174/93   Pulse: 63   Resp: 18   Weight: 77.4 kg (170 lb 9.6 oz)   Height: 5' 1" (1.549 m)     Physical Exam  Vitals and nursing note reviewed.   Constitutional:       General: She is not in acute distress.     Appearance: She is well-developed.   Cardiovascular:      Pulses:           Dorsalis pedis pulses are 2+ on the right side and 2+ on the left side.        Posterior tibial pulses are 2+ on the right side and 2+ on the " left side.   Pulmonary:      Effort: Pulmonary effort is normal.   Musculoskeletal:         General: Swelling and tenderness present.      Right foot: No deformity.      Left foot: No deformity.      Comments: Pain and edema dorsal left greater than right foot   Feet:      Right foot:      Skin integrity: Skin integrity normal.      Left foot:      Skin integrity: Skin integrity normal.   Skin:     Capillary Refill: Capillary refill takes less than 2 seconds.   Neurological:      General: No focal deficit present.      Comments: Pain upon compression 2nd common plantar digital nerve left foot which radiates to the dorsal cutaneous nerves consistent with neuroma.  There some early signs of a neuroma with pain 3rd common plantar digital nerve right foot   Psychiatric:         Mood and Affect: Mood normal.         Behavior: Behavior normal.         Thought Content: Thought content normal.         Judgment: Judgment normal.                         Assessment:       1. Neuroma of second interspace of left foot    2. 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 Edema of left foot    4. Neuritis of left foot    5. Pain in both feet          Plan:         18 MG BETAMETHASONE IM LEFT HIP   DICLOFENAC GEL APPLY AS DIRECTED T.I.D. BILATERAL FEET      Had a lengthy discussion with patient regarding a severe neuroma 2nd webspace left foot radiating to the top of the foot and advised patient this can definitely radiate up her leg at times especially at night.  Pointed out she is developing the same condition 3rd webspace right foot.    We discussed conservative treatments utilizing topical.  Prescribed diclofenac gel apply 3 times daily as directed to both feet  Instructed patient on ice/cool therapy in frequency this should be performed as long as well tolerated and beneficial  Reviewed with patient appropriate shoes, wide tennis shoe large toe box, thick sole for shock absorption and we discussed arch supports at length.   Advised patient with her history of plantar fasciitis and Achilles tendinitis arch supports need to be used long term  She was administered a walk-in be a couple years ago for her severe plantar fasciitis, states she may have it or her daughter may have a boot and we discussed using the boot for about a week to settle inflammation down and prevent her from pushing forward which pulls on this area with every step.  Instructed patient to utilize arch support in the walking boot  Discussed IM cortisone injection due to severe pain, condition starting on other foot and the severe radiating pain she experiences at night.  We reviewed effectiveness of this medication in decreasing inflammation, potential side effects and length of time injection may be beneficial.  Advised this condition improves quicker with all conservative treatments together on a daily basis for at least 2 weeks  Patient was in understanding and agreement with treatment plan, did want to pursue IM cortisone injection  I counseled the patient on their conditions, implications and medical management.  Instructed patient/family to contact the office with any changes, questions, concerns, worsening of symptoms.   Total face-to-face time, exam, assessment, treatment, discussion, documentation 30 minutes, more than half this time spent on consultation and coordination of care.  Follow up 2 weeks    This note was created using [employer] voice recognition software that occasionally misinterpreted phrases or words.

## 2022-11-22 ENCOUNTER — OFFICE VISIT (OUTPATIENT)
Dept: PODIATRY | Facility: CLINIC | Age: 72
End: 2022-11-22
Payer: MEDICARE

## 2022-11-22 ENCOUNTER — HOSPITAL ENCOUNTER (OUTPATIENT)
Dept: RADIOLOGY | Facility: HOSPITAL | Age: 72
Discharge: HOME | End: 2022-11-22
Attending: PODIATRIST
Payer: MEDICARE

## 2022-11-22 VITALS
HEART RATE: 55 BPM | BODY MASS INDEX: 32.28 KG/M2 | RESPIRATION RATE: 16 BRPM | DIASTOLIC BLOOD PRESSURE: 81 MMHG | WEIGHT: 171 LBS | SYSTOLIC BLOOD PRESSURE: 144 MMHG | HEIGHT: 61 IN

## 2022-11-22 DIAGNOSIS — G57.81 NEUROMA OF THIRD INTERSPACE OF RIGHT FOOT: ICD-10-CM

## 2022-11-22 DIAGNOSIS — M79.672 FOOT PAIN, LEFT: ICD-10-CM

## 2022-11-22 DIAGNOSIS — G57.92 NEURITIS OF LEFT FOOT: ICD-10-CM

## 2022-11-22 DIAGNOSIS — G57.62 NEUROMA OF SECOND INTERSPACE OF LEFT FOOT: Primary | ICD-10-CM

## 2022-11-22 DIAGNOSIS — R60.0 EDEMA OF LEFT FOOT: ICD-10-CM

## 2022-11-22 PROCEDURE — 99214 OFFICE O/P EST MOD 30 MIN: CPT | Mod: S$PBB,,, | Performed by: PODIATRIST

## 2022-11-22 PROCEDURE — 99215 OFFICE O/P EST HI 40 MIN: CPT | Mod: PBBFAC | Performed by: PODIATRIST

## 2022-11-22 PROCEDURE — 99999 PR PBB SHADOW E&M-EST. PATIENT-LVL V: ICD-10-PCS | Mod: PBBFAC,,, | Performed by: PODIATRIST

## 2022-11-22 PROCEDURE — 73630 XR FOOT COMPLETE 3 VIEW LEFT: ICD-10-PCS | Mod: 26,LT,, | Performed by: RADIOLOGY

## 2022-11-22 PROCEDURE — 99214 PR OFFICE/OUTPT VISIT, EST, LEVL IV, 30-39 MIN: ICD-10-PCS | Mod: S$PBB,,, | Performed by: PODIATRIST

## 2022-11-22 PROCEDURE — 99999 PR PBB SHADOW E&M-EST. PATIENT-LVL V: CPT | Mod: PBBFAC,,, | Performed by: PODIATRIST

## 2022-11-22 PROCEDURE — 73630 X-RAY EXAM OF FOOT: CPT | Mod: TC,LT

## 2022-11-22 PROCEDURE — 73630 X-RAY EXAM OF FOOT: CPT | Mod: 26,LT,, | Performed by: RADIOLOGY

## 2022-11-22 RX ORDER — LEVOTHYROXINE SODIUM 75 UG/1
TABLET ORAL
COMMUNITY
Start: 2022-05-12 | End: 2022-11-26 | Stop reason: SDUPTHER

## 2022-11-22 RX ORDER — DESLORATADINE 5 MG/1
5 TABLET ORAL
COMMUNITY
Start: 2022-05-12

## 2022-11-22 RX ORDER — POTASSIUM CHLORIDE 1500 MG/1
TABLET, EXTENDED RELEASE ORAL
COMMUNITY
Start: 2022-11-14

## 2022-11-22 RX ORDER — FUROSEMIDE 40 MG/1
TABLET ORAL
COMMUNITY
Start: 2022-11-14

## 2022-11-22 RX ORDER — LOSARTAN POTASSIUM 50 MG/1
TABLET ORAL
COMMUNITY
Start: 2022-05-12 | End: 2023-11-09

## 2022-11-22 RX ORDER — MONTELUKAST SODIUM 10 MG/1
10 TABLET ORAL
COMMUNITY
Start: 2022-09-15

## 2022-11-22 RX ORDER — IBUPROFEN 800 MG/1
800 TABLET ORAL
COMMUNITY
Start: 2022-08-22 | End: 2022-11-26 | Stop reason: SDUPTHER

## 2022-11-27 NOTE — PROGRESS NOTES
Subjective:       Patient ID: Judith Reyer is a 72 y.o. female.    Chief Complaint: Follow-up, Foot Pain, and Foot Swelling  Patient presents for follow-up neuroma/neuritis left foot.  Patient relates IM injection was very helpful, relieved 80% of her pain especially reading radiating shooting and burning pain upon foot to her leg.  States there is still some residual swelling of the foot, she has been applying diclofenac as instructed.  At times there is no pain, other times mild and then still a few occasions where pain is severe but overall much improved.  Pain level down from 8 to 4/10      Past Medical History:   Diagnosis Date    Allergy     Hypertension     Hypothyroidism      Past Surgical History:   Procedure Laterality Date    CHOLECYSTECTOMY      FOOT SURGERY      HYSTERECTOMY       Family History   Problem Relation Age of Onset    Stroke Mother     Cirrhosis Father      Social History     Socioeconomic History    Marital status:    Tobacco Use    Smoking status: Former    Smokeless tobacco: Never    Tobacco comments:     3 years ago quit   Substance and Sexual Activity    Alcohol use: Not Currently    Drug use: Never    Sexual activity: Not Currently       Current Outpatient Medications   Medication Sig Dispense Refill    acetaminophen 325 mg Cap   Oral, 0 Refill(s), Maintenance      amLODIPine (NORVASC) 5 MG tablet Take 5 mg by mouth every evening.      aspirin (ECOTRIN) 81 MG EC tablet Take 81 mg by mouth once daily.      atorvastatin (LIPITOR) 40 MG tablet Take 40 mg by mouth once daily.      calcium-vitamin D3 (OS-ZEFERINO 500 + D3) 500 mg-5 mcg (200 unit) per tablet   1 tab, Chewed, Daily, 0 Refill(s)      cetirizine (ZYRTEC) 10 MG tablet Take 10 mg by mouth once daily.      cyclobenzaprine (FLEXERIL) 10 MG tablet       desloratadine (CLARINEX) 5 mg tablet 5 mg.      diclofenac sodium (VOLTAREN) 1 % Gel Apply 2 g topically 3 (three) times daily. 100 g 2    fluticasone propionate (FLONASE) 50  mcg/actuation nasal spray 2 sprays.      furosemide (LASIX) 40 MG tablet   = 0.5 tab, Oral, Daily, TAKE WITH POTASSIUM., # 45 tab, 3 Refill(s), Maintenance, Pharmacy: [place] [id number], 158.5, cm, 11/14/22 9:38:00 CST, Height/Length Measured, 74.5, kg, 05/12/22 8:46:00 CDT, Weight Dosing      ibuprofen (ADVIL,MOTRIN) 800 MG tablet   = 1 tab, Oral, q8h, # 30 tab, 3 Refill(s), Maintenance, Pharmacy: [place] [id number]      losartan (COZAAR) 50 MG tablet   = 1 tab, Oral, Daily, X 90 day(s), # 90 tab, 3 Refill(s), Physician Stop, Pharmacy: [place] [address], 158.5, cm, 11/14/22 9:38:00 CST, Height/Length Measured, 74.5, kg, 05/12/22 8:46:00 CDT, Weight Dosing      metoprolol succinate (TOPROL-XL) 50 MG 24 hr tablet   = 1 tab, Oral, Daily, # 90 tab, 2 Refill(s), Maintenance, Pharmacy: [place] [address]      montelukast (SINGULAIR) 10 mg tablet 10 mg.      omega 3-dha-epa-fish oil (FISH OIL) 100-160-1,000 mg Cap   1 cap, Oral, Daily, # 100 cap, 0 Refill(s)      pantoprazole (PROTONIX) 40 MG tablet       potassium chloride (K-TAB) 20 mEq   = 0.5 tab, Oral, Daily, TAKE WITH LASIX., # 45 tab, 3 Refill(s), Maintenance, Pharmacy: [place] [facility] [address], 158.5, cm, 11/14/22 9:38:00 CST, Height/Length Measured, 74.5, kg, 05/12/22 8:46:00 CDT, Weight Dosing      potassium chloride (KLOR-CON) 10 MEQ TbSR Take 1 tablet by mouth once daily.      SYNTHROID 75 mcg tablet        No current facility-administered medications for this visit.     Review of patient's allergies indicates:   Allergen Reactions    Nitrofurantoin      Other reaction(s): Vomiting    Penicillin Rash    Iodine     Macrodantin [nitrofurantoin macrocrystalline]     Penicillins        Review of Systems   HENT:  Negative for congestion.    Respiratory:  Negative for cough.    Cardiovascular:  Negative for leg swelling.   Musculoskeletal:  Negative for gait problem.   All other systems reviewed and are negative.    Objective:      Vitals:    11/22/22 1013  "  BP: (!) 144/81   Pulse: (!) 55   Resp: 16   Weight: 77.6 kg (171 lb)   Height: 5' 1" (1.549 m)     Physical Exam  Vitals and nursing note reviewed.   Constitutional:       General: She is not in acute distress.     Appearance: She is well-developed.   Cardiovascular:      Pulses:           Dorsalis pedis pulses are 2+ on the right side and 2+ on the left side.        Posterior tibial pulses are 2+ on the right side and 2+ on the left side.   Pulmonary:      Effort: Pulmonary effort is normal.   Musculoskeletal:         General: Swelling and tenderness present.      Right foot: No deformity.      Left foot: No deformity.      Comments: Pain and edema left forefoot has improved significantly, there still residual pain and edema dorsal left foot   Feet:      Right foot:      Skin integrity: Skin integrity normal.      Left foot:      Skin integrity: Skin integrity normal.   Skin:     Capillary Refill: Capillary refill takes less than 2 seconds.   Neurological:      General: No focal deficit present.      Comments: Pain 2nd and 3rd common plantar digital nerve left foot improved   Psychiatric:         Mood and Affect: Mood normal.         Behavior: Behavior normal.         Thought Content: Thought content normal.         Judgment: Judgment normal.       EXAMINATION:  XR FOOT COMPLETE 3 VIEW LEFT     CLINICAL HISTORY:  . Pain in left foot     TECHNIQUE:  AP, lateral, and oblique views of the left foot were performed.     COMPARISON:  01/09/2020.     FINDINGS:  Mild degenerative osteoarthrosis of the 1st MTP joint and the IP joints of the digits.  Tarsal bones intact with normal tarsometatarsal alignment.  Moderate sized os peroneum.     Small dorsal and moderate plantar calcaneal spurs.     Impression:  1. Mild chronic changes of degenerative osteoarthrosis  2. Calcaneal spurs.        Electronically signed by: Isidoro Quezada  Date:                                            11/22/2022                        Assessment: "       1. Neuroma of second interspace of left foot    2. Foot pain, left    3. Neuritis of left foot    4. Neuroma of third interspace of right foot    5. Edema of left foot          Plan:         XRAY COMPLETE LEFT FOOT    X-rays taken of the left foot today and reviewed with patient at length  We again discussed etiology and pathology regarding neuroma and why this pain was radiating up to her leg  Discussed potential causes  Reviewed stretching  Reviewed neuroma 2nd and mild in 3rd webspace left foot. Radiating pain has improved but needs continued daily treatment until completely resolved.  Advised patient if she increases her activity without this condition completely resolved it most likely will flare-up again.  Had a lengthy discussion regarding wide tennis shoes, light weight, thick sole for shock absorption which need to be worn indoors as well, absolutely no flat shoes or walking barefoot  We discussed full length arch supports, removing existing insoles and how to gradually adjust to wearing comfortably making sure shoe accommodates them appropriately, no tight or narrow shoes  Continue topical diclofenac 3 times daily  In addition we discussed ice/cool therapy in increasing this as long as well tolerated and beneficial  Briefly discussed cortisone injection  Patient was in understanding and agreement with treatment plan  I counseled the patient on their conditions, implications and medical management.  Instructed patient/family to contact the office with any changes, questions, concerns, worsening of symptoms.   Total face-to-face time, exam, assessment, treatment, discussion, documentation 30 minutes, more than half this time spent on consultation and coordination of care.  Follow up 2 weeks    This note was created using [employer] voice recognition software that occasionally misinterpreted phrases or words.

## 2022-12-06 ENCOUNTER — OFFICE VISIT (OUTPATIENT)
Dept: PODIATRY | Facility: CLINIC | Age: 72
End: 2022-12-06
Payer: MEDICARE

## 2022-12-06 VITALS
HEART RATE: 64 BPM | DIASTOLIC BLOOD PRESSURE: 82 MMHG | SYSTOLIC BLOOD PRESSURE: 153 MMHG | WEIGHT: 171 LBS | RESPIRATION RATE: 18 BRPM | BODY MASS INDEX: 32.28 KG/M2 | HEIGHT: 61 IN

## 2022-12-06 DIAGNOSIS — G57.62 NEUROMA OF SECOND INTERSPACE OF LEFT FOOT: Primary | ICD-10-CM

## 2022-12-06 PROCEDURE — 99999 PR PBB SHADOW E&M-EST. PATIENT-LVL IV: CPT | Mod: PBBFAC,,, | Performed by: PODIATRIST

## 2022-12-06 PROCEDURE — 99214 OFFICE O/P EST MOD 30 MIN: CPT | Mod: PBBFAC | Performed by: PODIATRIST

## 2022-12-06 PROCEDURE — 99999 PR PBB SHADOW E&M-EST. PATIENT-LVL IV: ICD-10-PCS | Mod: PBBFAC,,, | Performed by: PODIATRIST

## 2022-12-06 PROCEDURE — 99213 OFFICE O/P EST LOW 20 MIN: CPT | Mod: S$PBB,,, | Performed by: PODIATRIST

## 2022-12-06 PROCEDURE — 99213 PR OFFICE/OUTPT VISIT, EST, LEVL III, 20-29 MIN: ICD-10-PCS | Mod: S$PBB,,, | Performed by: PODIATRIST

## 2022-12-06 RX ORDER — METHYLPREDNISOLONE 4 MG/1
TABLET ORAL
Qty: 1 EACH | Refills: 0 | Status: SHIPPED | OUTPATIENT
Start: 2022-12-06

## 2022-12-06 RX ORDER — POTASSIUM CHLORIDE 20 MEQ/1
TABLET, EXTENDED RELEASE ORAL
COMMUNITY
Start: 2022-11-09 | End: 2022-12-06 | Stop reason: SDUPTHER

## 2022-12-06 RX ORDER — PREDNISONE 20 MG/1
TABLET ORAL
COMMUNITY
Start: 2022-09-15 | End: 2022-12-06 | Stop reason: ALTCHOICE

## 2022-12-09 NOTE — PROGRESS NOTES
Subjective:       Patient ID: Judith Reyer is a 72 y.o. female.    Chief Complaint: Follow-up, Foot Pain, Foot Swelling, and Leg Problem  Patient presents for follow-up neuroma/neuritis left foot.  Patient relates her foot has continued to improved but now having pain upper left buttocks, assuming it's sciatic pain.  Pain level left hip 7/10      Past Medical History:   Diagnosis Date    Allergy     Hypertension     Hypothyroidism      Past Surgical History:   Procedure Laterality Date    CHOLECYSTECTOMY      FOOT SURGERY      HYSTERECTOMY       Family History   Problem Relation Age of Onset    Stroke Mother     Cirrhosis Father      Social History     Socioeconomic History    Marital status:    Tobacco Use    Smoking status: Former    Smokeless tobacco: Never    Tobacco comments:     3 years ago quit   Substance and Sexual Activity    Alcohol use: Not Currently    Drug use: Never    Sexual activity: Not Currently       Current Outpatient Medications   Medication Sig Dispense Refill    acetaminophen 325 mg Cap   Oral, 0 Refill(s), Maintenance      aspirin (ECOTRIN) 81 MG EC tablet Take 81 mg by mouth once daily.      atorvastatin (LIPITOR) 40 MG tablet Take 40 mg by mouth once daily.      desloratadine (CLARINEX) 5 mg tablet 5 mg.      fluticasone propionate (FLONASE) 50 mcg/actuation nasal spray 2 sprays.      furosemide (LASIX) 40 MG tablet   = 0.5 tab, Oral, Daily, TAKE WITH POTASSIUM., # 45 tab, 3 Refill(s), Maintenance, Pharmacy: [id number], 158.5, cm, 11/14/22 9:38:00 CST, Height/Length Measured, 74.5, kg, 05/12/22 8:46:00 CDT, Weight Dosing      ibuprofen (ADVIL,MOTRIN) 800 MG tablet   = 1 tab, Oral, q8h, # 30 tab, 3 Refill(s), Maintenance, Pharmacy: [id number]      losartan (COZAAR) 50 MG tablet   = 1 tab, Oral, Daily, X 90 day(s), # 90 tab, 3 Refill(s), Physician Stop, Pharmacy: [id number], 158.5, cm, 11/14/22 9:38:00 CST, Height/Length Measured, 74.5, kg, 05/12/22  "8:46:00 CDT, Weight Dosing      montelukast (SINGULAIR) 10 mg tablet 10 mg.      pantoprazole (PROTONIX) 40 MG tablet       potassium chloride (K-TAB) 20 mEq   = 0.5 tab, Oral, Daily, TAKE WITH LASIX., # 45 tab, 3 Refill(s), Maintenance, Pharmacy: [id number], 158.5, cm, 11/14/22 9:38:00 CST, Height/Length Measured, 74.5, kg, 05/12/22 8:46:00 CDT, Weight Dosing      SYNTHROID 75 mcg tablet       amLODIPine (NORVASC) 5 MG tablet Take 5 mg by mouth every evening.      calcium-vitamin D3 (OS-ZEFERINO 500 + D3) 500 mg-5 mcg (200 unit) per tablet   1 tab, Chewed, Daily, 0 Refill(s)      cetirizine (ZYRTEC) 10 MG tablet Take 10 mg by mouth once daily.      cyclobenzaprine (FLEXERIL) 10 MG tablet       diclofenac sodium (VOLTAREN) 1 % Gel Apply 2 g topically 3 (three) times daily. 100 g 2    methylPREDNISolone (MEDROL DOSEPACK) 4 mg tablet use as directed 1 each 0    metoprolol succinate (TOPROL-XL) 50 MG 24 hr tablet   = 1 tab, Oral, Daily, # 90 tab, 2 Refill(s), Maintenance, Pharmacy: [id number]      omega 3-dha-epa-fish oil (FISH OIL) 100-160-1,000 mg Cap   1 cap, Oral, Daily, # 100 cap, 0 Refill(s)       No current facility-administered medications for this visit.     Review of patient's allergies indicates:   Allergen Reactions    Nitrofurantoin      Other reaction(s): Vomiting    Penicillin Rash    Iodine     Macrodantin [nitrofurantoin macrocrystalline]     Penicillins        Review of Systems   HENT:  Negative for congestion.    Respiratory:  Negative for cough.    Cardiovascular:  Negative for leg swelling.   Musculoskeletal:  Negative for gait problem.   All other systems reviewed and are negative.    Objective:      Vitals:    12/06/22 1055   BP: (!) 153/82   Pulse: 64   Resp: 18   Weight: 77.6 kg (171 lb)   Height: 5' 1" (1.549 m)     Physical Exam  Vitals and nursing note reviewed.   Constitutional:       General: She is not in acute distress.     Appearance: She is well-developed. "   Cardiovascular:      Pulses:           Dorsalis pedis pulses are 2+ on the right side and 2+ on the left side.        Posterior tibial pulses are 2+ on the right side and 2+ on the left side.   Pulmonary:      Effort: Pulmonary effort is normal.   Musculoskeletal:      Right foot: No deformity.      Left foot: No deformity.   Feet:      Right foot:      Skin integrity: Skin integrity normal.      Left foot:      Skin integrity: Skin integrity normal.   Skin:     Capillary Refill: Capillary refill takes less than 2 seconds.   Neurological:      General: No focal deficit present.      Comments: Resolving pain 2nd and 3rd common plantar digital nerve left foot   Psychiatric:         Behavior: Behavior normal.         Thought Content: Thought content normal.       EXAMINATION:  XR FOOT COMPLETE 3 VIEW LEFT  CLINICAL HISTORY:  Pain in left foot  TECHNIQUE:  AP, lateral, and oblique views of the left foot were performed.  COMPARISON:  01/09/2020.  FINDINGS:  Mild degenerative osteoarthrosis of the 1st MTP joint and the IP joints of the digits.  Tarsal bones intact with normal tarsometatarsal alignment. Moderate sized os peroneum.     Small dorsal and moderate plantar calcaneal spurs.     Impression:  1. Mild chronic changes of degenerative osteoarthrosis  2. Calcaneal spurs.     Electronically signed by: Isidoro Quezada  Date:                                            11/22/2022                  Assessment:       1. Neuroma of second interspace of left foot          Plan:         4MG MEDROL DOSEPACK      Reviewed resolving neuroma 2nd and 3rd webspace left foot. Advised patient while this area has done extremely well with IM cortisone injection she needs to address residual pain, inflammation and we reviewed use of ice /cool therapy and topical diclofenac 3 times daily along with oral steroids  Had a lengthy discussion regarding appropriate shoes, must be wide, light, thick sole for shock absorption and we discussed  proper tennis / walking shoe as well as an appropriate shoe indoors  Explained to patient she is definitely having symptoms of sciatica on the left, I do not feel this is a coincidence with neuromas of the left foot, steroid for foot may help, however she needs to contact her PCP regarding treatment.    Patient was in understanding and agreement with treatment plan  I counseled the patient on their conditions, implications and medical management.  Instructed patient/family to contact the office with any changes, questions, concerns, worsening of symptoms.   Total face-to-face time, exam, assessment, treatment, discussion, documentation 20 minutes, more than half this time spent on consultation and coordination of care.  Follow up as needed if left foot is not pain free after medrol dose pack      This note was created using [employer] voice recognition software that occasionally misinterpreted phrases or words.